# Patient Record
Sex: MALE | Race: WHITE | NOT HISPANIC OR LATINO | URBAN - METROPOLITAN AREA
[De-identification: names, ages, dates, MRNs, and addresses within clinical notes are randomized per-mention and may not be internally consistent; named-entity substitution may affect disease eponyms.]

---

## 2018-05-30 ENCOUNTER — TELEPHONE (OUTPATIENT)
Dept: FAMILY MEDICINE CLINIC | Facility: CLINIC | Age: 58
End: 2018-05-30

## 2022-08-02 ENCOUNTER — TELEPHONE (OUTPATIENT)
Dept: HEMATOLOGY ONCOLOGY | Facility: CLINIC | Age: 62
End: 2022-08-02

## 2022-08-02 NOTE — TELEPHONE ENCOUNTER
Patient calling to confirm if we received fax from his physician  I informed him we haven't received fax and gave fax number [phone]

## 2022-08-12 ENCOUNTER — TELEPHONE (OUTPATIENT)
Dept: HEMATOLOGY ONCOLOGY | Facility: CLINIC | Age: 62
End: 2022-08-12

## 2022-08-12 NOTE — TELEPHONE ENCOUNTER
Oncology [facility] Referral Intake    Is this a referral between [address] [place] [address] departments: [unfilled]     Speaking to the patient directly: [unfilled]     Relationship to patient of person speaking with: [unfilled]     What is the best call back phone number: [unfilled]      [facility] Oncology departments seen in previously: [unfilled]     Diagnosis Site: [unfilled]     Has the diagnosis been confirmed through biopsy and/or surgery: [unfilled]     Biopsy/Surgery Date: [unfilled]     Was the surgery and/or biopsy performed at [facility]: [unfilled]     Indicate the outside facility where the surgery/biopsy was performed: [unfilled]     Is this a referral for a 2nd opinion: [unfilled]     Names and Organizations of prior Oncologists: [unfilled]     Is the patient currently undergoing or scheduled to start oncology treatment: [unfilled]     Facility where patient is receiving/scheduled to recieve treatment: [unfilled]     Does the patient have a prior history of cancer diagnosis/treatment: [unfilled]     Patient received previous radiation therapy: [unfilled]     Indicate the outside facility where the prior radiation therapy was performed: [unfilled]     Patient received previous chemotherapy: [unfilled]     Indicate the outside facility where the prior chemotherapy was performed: [unfilled]     Did the patient complete prescribed treatment: [unfilled]     Did you provide the proper address for sending imaging disks: [unfilled]     Has the patient previously undergone genetic/genomic testing: [unfilled]     Did you advise the patient to bring a copy of the genetic/genomic test results w/ them to their visit: [unfilled]     Did you advise the patient to have records faxed to [phone]: [unfilled]     Did you send the new patient paperwork to the patient: [unfilled]     Did you confirm the patient's insurance coverage with them: @[facility]EL[facility]@     Was the patient offered an appointment within 10 business days: [unfilled]     Reason appt not offered w/ in 10 business days: [unfilled]

## 2022-08-16 ENCOUNTER — CONSULT (OUTPATIENT)
Dept: HEMATOLOGY ONCOLOGY | Facility: CLINIC | Age: 62
End: 2022-08-16
Payer: COMMERCIAL

## 2022-08-16 VITALS
TEMPERATURE: 97.1 F | OXYGEN SATURATION: 98 % | SYSTOLIC BLOOD PRESSURE: 128 MMHG | DIASTOLIC BLOOD PRESSURE: 82 MMHG | RESPIRATION RATE: 18 BRPM | WEIGHT: 238 LBS | HEART RATE: 90 BPM | BODY MASS INDEX: 35.25 KG/M2 | HEIGHT: 69 IN

## 2022-08-16 DIAGNOSIS — D72.829 LEUKOCYTOSIS, UNSPECIFIED TYPE: Primary | ICD-10-CM

## 2022-08-16 PROBLEM — R73.01 IMPAIRED FASTING GLUCOSE: Status: ACTIVE | Noted: 2022-08-16

## 2022-08-16 PROBLEM — E55.9 VITAMIN D DEFICIENCY: Status: ACTIVE | Noted: 2022-08-16

## 2022-08-16 PROBLEM — E66.09 OBESITY DUE TO EXCESS CALORIES: Status: ACTIVE | Noted: 2022-08-16

## 2022-08-16 PROBLEM — D86.9 SARCOIDOSIS: Status: ACTIVE | Noted: 2022-08-16

## 2022-08-16 PROBLEM — E78.49 OTHER HYPERLIPIDEMIA: Status: ACTIVE | Noted: 2022-08-16

## 2022-08-16 PROBLEM — J30.2 SEASONAL ALLERGIES: Status: ACTIVE | Noted: 2022-08-16

## 2022-08-16 PROCEDURE — 99204 OFFICE O/P NEW MOD 45 MIN: CPT | Performed by: PHYSICIAN ASSISTANT

## 2022-08-16 RX ORDER — FENOFIBRATE 145 MG/1
TABLET, COATED ORAL
COMMUNITY
Start: 2022-08-02

## 2022-08-16 RX ORDER — DIMENHYDRINATE 50 MG
TABLET ORAL
COMMUNITY

## 2022-08-16 RX ORDER — METFORMIN HYDROCHLORIDE 500 MG/1
TABLET, EXTENDED RELEASE ORAL
COMMUNITY
Start: 2022-08-02

## 2022-08-16 RX ORDER — GINKGO BILOBA LEAF EXTRACT 60 MG
CAPSULE ORAL
COMMUNITY

## 2022-08-16 NOTE — PROGRESS NOTES
Hematology/Oncology Outpatient Consult  Maximino Bennett 60 y o  male 1960 [id number]    Date:  8/16/2022      Assessment and Plan:  1  Leukocytosis  60year old male presents for consult for leukocytosis  CBC without differential is what was ordered by PCP, showed WBC 44 5, on 7/5/22  Hgb and platelets normal  Reviewed with patient unfortunately I cannot give any definitive info today or any further work up with a differential  The type of WBC that is elevated will then point to further work up  Reviewed possibilities to include CLL, CML, reactive phenomenon, possibly reactivation of sarcoidosis  Patient is also asymptomatic and no physical exam findings  He will complete CBC-D this week at [place] [address] in [facility]  Will then make further recommendations after this is completed  - CBC and differential; Future    HPI:  79-year-old male presents for consultation visit regarding leukocytosis  Patient's PCP is Dr Sandra Shaikh  He had a CBC without differential completed on 07/05/2022 which showed elevated WBC  There are no labs to compare prior to this 1 result  Dx with sarcoidosis about 20 years ago, dx with PCP (Oj) and pulmonary  Another doctor in the pulmonary office said it could be asthma  Most recent colon[facility] at [facility], about 6 years ago, [facility]      ROS: Review of Systems   Constitutional: Negative for appetite change, chills, fatigue, fever and unexpected weight change  Denies night sweats    Respiratory: Negative for cough and shortness of breath  Cardiovascular: Negative for chest pain, palpitations and leg swelling  Gastrointestinal: Negative for abdominal pain, constipation, diarrhea, nausea and vomiting  Genitourinary: Negative for difficulty urinating, dysuria and hematuria  Musculoskeletal: Positive for arthralgias  Skin: Negative      Neurological: Negative for dizziness, weakness, light-headedness and headaches  Hematological: Negative  Psychiatric/Behavioral: Negative          Past Medical History:   Diagnosis Date    Obesity due to excess calories     Other hyperlipidemia     Seasonal allergies     Vitamin D deficiency        Past Surgical History:   Procedure Laterality Date    CHOLECYSTECTOMY         Social History     Socioeconomic History    Marital status: /Civil Union     Spouse name: None    Number of children: None    Years of education: None    Highest education level: None   Occupational History    None   Tobacco Use    Smoking status: Never Smoker    Smokeless tobacco: Never Used   Substance and Sexual Activity    Alcohol use: Never    Drug use: Never    Sexual activity: None   Other Topics Concern    None   Social History Narrative    None     Social Determinants of Health     Financial Resource Strain: Not on file   Food Insecurity: Not on file   Transportation Needs: Not on file   Physical Activity: Not on file   Stress: Not on file   Social Connections: Not on file   Intimate Partner Violence: Not on file   Housing Stability: Not on file       Family History   Problem Relation Age of Onset    Esophageal cancer Mother     Colon cancer Father     Lymphoma Father     Lung cancer Father        No Known Allergies      Current Outpatient Medications:     aspirin 1 mg/mL SUSP, Take by mouth, Disp: , Rfl:     fenofibrate (TRICOR) 145 mg tablet, , Disp: , Rfl:     Flaxseed, Linseed, (Flax Seed Oil) 1000 MG CAPS, Take by mouth, Disp: , Rfl:     Ginseng 100 MG CAPS, Take by mouth, Disp: , Rfl:     metFORMIN (GLUCOPHAGE-XR) 500 mg 24 hr tablet, , Disp: , Rfl:     Pediatric Multivitamins-Fl (MULTIVITAMINS/FL PO), Take by mouth, Disp: , Rfl:       Physical Exam:  /82 (BP Location: Left arm)   Pulse 90   Temp (!) 97 1 °F (36 2 °C) (Tympanic)   Resp 18   Ht 5' 8 5" (1 74 m)   Wt 108 kg (238 lb)   SpO2 98%   BMI 35 66 kg/m²     Physical Exam  Vitals reviewed  Constitutional:       General: He is not in acute distress  Appearance: He is well-developed  He is not ill-appearing  HENT:      Head: Normocephalic and atraumatic  Eyes:      General: No scleral icterus  Conjunctiva/sclera: Conjunctivae normal    Cardiovascular:      Rate and Rhythm: Normal rate and regular rhythm  Heart sounds: Normal heart sounds  No murmur heard  Pulmonary:      Effort: Pulmonary effort is normal  No respiratory distress  Breath sounds: Normal breath sounds  Chest:   Breasts:      Right: No axillary adenopathy or supraclavicular adenopathy  Left: No axillary adenopathy or supraclavicular adenopathy  Abdominal:      Palpations: Abdomen is soft  Tenderness: There is no abdominal tenderness  Musculoskeletal:         General: No tenderness  Normal range of motion  Cervical back: Normal range of motion and neck supple  Right lower leg: No edema  Left lower leg: No edema  Lymphadenopathy:      Cervical: No cervical adenopathy  Upper Body:      Right upper body: No supraclavicular or axillary adenopathy  Left upper body: No supraclavicular or axillary adenopathy  Skin:     General: Skin is warm and dry  Neurological:      Mental Status: He is alert and oriented to person, place, and time  Cranial Nerves: No cranial nerve deficit  Psychiatric:         Mood and Affect: Mood normal          Behavior: Behavior normal        Patient voiced understanding and agreement in the above discussion  Aware to contact our office with questions/symptoms in the interim  This note has been generated by voice recognition software system  Therefore, there may be spelling, grammar, and or syntax errors  Please contact if questions arise

## 2022-08-17 ENCOUNTER — TELEPHONE (OUTPATIENT)
Dept: OTHER | Facility: OTHER | Age: 62
End: 2022-08-17

## 2022-08-17 NOTE — TELEPHONE ENCOUNTER
Lab Result: Critical Lab:WBC 52 7   Date/Time Drawn: 08/17/22 at 1124am   Ordering Provider: Dr Felice Guerra Name: [phone]/[id number] from [facility]       The following critical/stat result was read back to the lab as stated above and [employer] to the on-call provider  The provider confirmed receipt of the message

## 2022-08-18 ENCOUNTER — TELEPHONE (OUTPATIENT)
Dept: HEMATOLOGY ONCOLOGY | Facility: CLINIC | Age: 62
End: 2022-08-18

## 2022-08-18 DIAGNOSIS — D72.820 LYMPHOCYTOSIS: Primary | ICD-10-CM

## 2022-08-18 NOTE — TELEPHONE ENCOUNTER
CALL RETURN FORM   Reason for patient call? Patient calling to speak with Malvina Severe  She left a message @ 3:30pm and patient has some questions    Patient's primary oncologist? Dr Rigoberto Galeano   Name of person the patient was calling for? Buck   Any additional information to add, if applicable? [phone]   Informed patient that the message will be forwarded to the team and someone will get back to them as soon as possible    Did you relay this information to the patient?  yes

## 2022-08-18 NOTE — TELEPHONE ENCOUNTER
Left message for patient to review CBC-D  Reviewed further testing is warranted  Advised patient I will be out of the office on 8/19/22  If he calls back when I am not here, he can talk to nurse/Dr Lawler  Labs show he likely has CLL, as discussed at visit  Will just require monitoring, no treatment at this time  He needs leukemia/lymphoma flow cytometry, IgG  Order placed  He goes to [facility] so labs will need to be faxed

## 2022-08-18 NOTE — TELEPHONE ENCOUNTER
Spoke with patient  Reviewed this is likely CLL  Needs more blood tests  He does not have fax number for [facility] lab in [facility]  He will call tomorrow with fax number  Staff to then fax leukemia/lymphoma flow and IgG labs

## 2022-08-19 NOTE — TELEPHONE ENCOUNTER
Telephone call left voice message  I faxed lab rxs to [facility] at [facility] [phone] and to have them drawn about 1 week prior to fu appt with Patel Moody in October  Any questions to call me back at [phone]

## 2022-09-13 ENCOUNTER — TELEPHONE (OUTPATIENT)
Dept: HEMATOLOGY ONCOLOGY | Facility: CLINIC | Age: 62
End: 2022-09-13

## 2022-09-13 NOTE — TELEPHONE ENCOUNTER
Please call patient and ask if he completed the labs orders requested, was sent to his lab in [place] on 8/19

## 2022-09-13 NOTE — TELEPHONE ENCOUNTER
SARAIM to the patient to ask if he completed the lab orders that Gideon Mendiola PA-C requested and sent over to [facility] on 8/19/22  Informed patient to give the office a call back at [phone]

## 2022-09-15 ENCOUNTER — TELEPHONE (OUTPATIENT)
Dept: HEMATOLOGY ONCOLOGY | Facility: CLINIC | Age: 62
End: 2022-09-15

## 2022-09-15 NOTE — TELEPHONE ENCOUNTER
Pt returned telephone call to say that he will have labs drawn end of next week for his Oct appt with Carmen Garcia

## 2022-09-23 ENCOUNTER — TELEPHONE (OUTPATIENT)
Dept: OTHER | Facility: OTHER | Age: 62
End: 2022-09-23

## 2022-09-23 NOTE — TELEPHONE ENCOUNTER
Lab Result: WBC 50 7    Date/Time Drawn: 9/23/22 0900   Ordering Provider: Dr Kari Alamo Name: Gladis Luciano       The following critical/stat result was read back to the lab as stated above and [employer] to the on-call provider  The provider confirmed receipt of the message

## 2022-09-26 DIAGNOSIS — D72.820 LYMPHOCYTOSIS: ICD-10-CM

## 2022-09-26 DIAGNOSIS — D72.829 LEUKOCYTOSIS, UNSPECIFIED TYPE: Primary | ICD-10-CM

## 2022-09-28 ENCOUNTER — TELEPHONE (OUTPATIENT)
Dept: HEMATOLOGY ONCOLOGY | Facility: CLINIC | Age: 62
End: 2022-09-28

## 2022-10-01 ENCOUNTER — TELEPHONE (OUTPATIENT)
Dept: OTHER | Facility: OTHER | Age: 62
End: 2022-10-01

## 2022-10-01 ENCOUNTER — APPOINTMENT (OUTPATIENT)
Dept: LAB | Facility: MEDICAL CENTER | Age: 62
End: 2022-10-01
Payer: COMMERCIAL

## 2022-10-01 DIAGNOSIS — D72.829 LEUKOCYTOSIS, UNSPECIFIED TYPE: ICD-10-CM

## 2022-10-01 DIAGNOSIS — D72.820 LYMPHOCYTOSIS: ICD-10-CM

## 2022-10-01 LAB
ERYTHROCYTE [DISTWIDTH] IN BLOOD BY AUTOMATED COUNT: 13.2 % (ref 11.6–15.1)
HCT VFR BLD AUTO: 48.6 % (ref 36.5–49.3)
HGB BLD-MCNC: 15.5 G/DL (ref 12–17)
IGG SERPL-MCNC: 1130 MG/DL (ref 700–1600)
MCH RBC QN AUTO: 30 PG (ref 26.8–34.3)
MCHC RBC AUTO-ENTMCNC: 31.9 G/DL (ref 31.4–37.4)
MCV RBC AUTO: 94 FL (ref 82–98)
PLATELET # BLD AUTO: 252 THOUSANDS/UL (ref 149–390)
PMV BLD AUTO: 9.9 FL (ref 8.9–12.7)
RBC # BLD AUTO: 5.16 MILLION/UL (ref 3.88–5.62)
WBC # BLD AUTO: 51.84 THOUSAND/UL (ref 4.31–10.16)

## 2022-10-01 PROCEDURE — 85025 COMPLETE CBC W/AUTO DIFF WBC: CPT

## 2022-10-01 PROCEDURE — 82784 ASSAY IGA/IGD/IGG/IGM EACH: CPT

## 2022-10-01 PROCEDURE — 88184 FLOWCYTOMETRY/ TC 1 MARKER: CPT

## 2022-10-01 PROCEDURE — 85007 BL SMEAR W/DIFF WBC COUNT: CPT

## 2022-10-01 PROCEDURE — 88185 FLOWCYTOMETRY/TC ADD-ON: CPT

## 2022-10-01 PROCEDURE — 36415 COLL VENOUS BLD VENIPUNCTURE: CPT

## 2022-10-01 PROCEDURE — 85027 COMPLETE CBC AUTOMATED: CPT

## 2022-10-01 NOTE — TELEPHONE ENCOUNTER
Lab Result: WBC of 51 84   Date/Time Drawn: 10/1 @ 1040am    Ordering Provider: Ana Ayoub Name: delilah       The following critical/stat result was read back to the lab as stated above and [employer] to the on-call provider  The provider confirmed receipt of the message

## 2022-10-03 LAB
BASOPHILS # BLD MANUAL: 0 THOUSAND/UL (ref 0–0.1)
BASOPHILS NFR MAR MANUAL: 0 % (ref 0–1)
DIFFERENTIAL COMMENT: ABNORMAL
EOSINOPHIL # BLD MANUAL: 0 THOUSAND/UL (ref 0–0.4)
EOSINOPHIL NFR BLD MANUAL: 0 % (ref 0–6)
ERYTHROCYTE [DISTWIDTH] IN BLOOD BY AUTOMATED COUNT: 13.2 % (ref 11.6–15.1)
HCT VFR BLD AUTO: 48.6 % (ref 36.5–49.3)
HGB BLD-MCNC: 15.5 G/DL (ref 12–17)
LYMPHOCYTES # BLD AUTO: 41.99 THOUSAND/UL (ref 0.6–4.47)
LYMPHOCYTES # BLD AUTO: 81 % (ref 14–44)
MCH RBC QN AUTO: 30 PG (ref 26.8–34.3)
MCHC RBC AUTO-ENTMCNC: 31.9 G/DL (ref 31.4–37.4)
MCV RBC AUTO: 94 FL (ref 82–98)
MONOCYTES # BLD AUTO: 0.52 THOUSAND/UL (ref 0–1.22)
MONOCYTES NFR BLD: 1 % (ref 4–12)
NEUTROPHILS # BLD MANUAL: 5.7 THOUSAND/UL (ref 1.85–7.62)
NEUTS SEG NFR BLD AUTO: 11 % (ref 43–75)
PATHOLOGY REVIEW: YES
PLATELET # BLD AUTO: 252 THOUSANDS/UL (ref 149–390)
PLATELET BLD QL SMEAR: ADEQUATE
PMV BLD AUTO: 9.9 FL (ref 8.9–12.7)
RBC # BLD AUTO: 5.16 MILLION/UL (ref 3.88–5.62)
SMUDGE CELLS BLD QL SMEAR: PRESENT
VARIANT LYMPHS # BLD AUTO: 7 %
WBC # BLD AUTO: 51.84 THOUSAND/UL (ref 4.31–10.16)

## 2022-10-04 ENCOUNTER — OFFICE VISIT (OUTPATIENT)
Dept: HEMATOLOGY ONCOLOGY | Facility: CLINIC | Age: 62
End: 2022-10-04
Payer: COMMERCIAL

## 2022-10-04 VITALS
HEART RATE: 68 BPM | WEIGHT: 242 LBS | BODY MASS INDEX: 35.84 KG/M2 | RESPIRATION RATE: 18 BRPM | TEMPERATURE: 98.3 F | SYSTOLIC BLOOD PRESSURE: 120 MMHG | HEIGHT: 69 IN | OXYGEN SATURATION: 98 % | DIASTOLIC BLOOD PRESSURE: 84 MMHG

## 2022-10-04 DIAGNOSIS — C91.10 CLL (CHRONIC LYMPHOCYTIC LEUKEMIA) (HCC): Primary | ICD-10-CM

## 2022-10-04 LAB — SCAN RESULT: NORMAL

## 2022-10-04 PROCEDURE — 99214 OFFICE O/P EST MOD 30 MIN: CPT | Performed by: PHYSICIAN ASSISTANT

## 2022-10-04 RX ORDER — L.ACID/L.CASEI/B.BIF/B.LON/FOS 2B CELL-50
1 CAPSULE ORAL DAILY
COMMUNITY

## 2022-10-04 NOTE — PROGRESS NOTES
Hematology/Oncology Outpatient Follow-up  Wilbur Rg 60 y o  male 1960 [id number]    Date:  10/4/2022      Assessment and Plan:  1  CLL (chronic lymphocytic leukemia) ([facility] [address] )  60year old male presents for follow up for leukocytosis  Work up is consistent with CLL  Reviewed CLL with patient  Reviewed he only requires monitoring at this time  No tx needed at this time  We reviewed indications for treatment in the future, should he need tx  Unknown how long he has had this due to gap in blood work  He is presently asymptomatic  Reviewed will perform CLL FISH and IgVH to obtain more info on the behavior of his CLL, ie if it may progress more quickly or not  We reviewed Evusheld  He had COVID vaccine x 2, no boosters  IgG level is normal  He is not interested in Evusheld at this time  Follow up in 3 months  - Chronic Lymphocytic Leukemia, IgVH Mutation Status, Cell-based; Future  - CLL profile, fish; Future  - CBC and differential; Future  - IgG; Future    HPI:  60year old male presents for follow up for leukocytosis  He was seen in consult in Aug 2022  He had a CBC without differential completed on 07/05/2022 which showed elevated WBC      There are no labs to compare prior to this 1 result      Dx with sarcoidosis about 20 years ago, dx with PCP (Oj) and pulmonary  Another doctor in the pulmonary office said it could be asthma       Most recent colonoscopy was at [facility], about 6 years ago    Interval history:    ROS: Review of Systems   Constitutional: Negative for appetite change, chills, fatigue, fever and unexpected weight change  HENT: Negative for mouth sores and nosebleeds  Respiratory: Negative for cough and shortness of breath  Cardiovascular: Negative for chest pain, palpitations and leg swelling  Gastrointestinal: Negative for abdominal pain, blood in stool, constipation, diarrhea, nausea and vomiting     Genitourinary: Negative for difficulty urinating, dysuria and hematuria  Musculoskeletal: Negative for arthralgias  Skin: Negative  Neurological: Negative for dizziness, weakness, light-headedness, numbness and headaches  Hematological: Negative  Psychiatric/Behavioral: Negative          Past Medical History:   Diagnosis Date    Obesity due to excess calories     Other hyperlipidemia     Seasonal allergies     Vitamin D deficiency        Past Surgical History:   Procedure Laterality Date    CHOLECYSTECTOMY         Social History     Socioeconomic History    Marital status: /Civil Union     Spouse name: None    Number of children: None    Years of education: None    Highest education level: None   Occupational History    None   Tobacco Use    Smoking status: Never Smoker    Smokeless tobacco: Never Used   Substance and Sexual Activity    Alcohol use: Never    Drug use: Never    Sexual activity: None   Other Topics Concern    None   Social History Narrative    None     Social Determinants of Health     Financial Resource Strain: Not on file   Food Insecurity: Not on file   Transportation Needs: Not on file   Physical Activity: Not on file   Stress: Not on file   Social Connections: Not on file   Intimate Partner Violence: Not on file   Housing Stability: Not on file       Family History   Problem Relation Age of Onset    Esophageal cancer Mother     Colon cancer Father     Lymphoma Father     Lung cancer Father        No Known Allergies      Current Outpatient Medications:     Flaxseed, Linseed, (Flax Seed Oil) 1000 MG CAPS, Take by mouth, Disp: , Rfl:     Ginseng 100 MG CAPS, Take by mouth, Disp: , Rfl:     Multiple Vitamins-Minerals (Mens Multi Vitamin & Mineral) TABS, Take 1 tablet by mouth daily, Disp: , Rfl:     Probiotic Product (Probiotic Blend) CAPS, Take 1 capsule by mouth in the morning, Disp: , Rfl:     ZINC-VITAMIN C PO, Take 1 Dose by mouth daily Chewable, Disp: , Rfl:     fenofibrate (TRICOR) 145 mg tablet, , Disp: , Rfl:     metFORMIN (GLUCOPHAGE-XR) 500 mg 24 hr tablet, , Disp: , Rfl:       Physical Exam:  /84 (BP Location: Left arm, Patient Position: Sitting, Cuff Size: Large)   Pulse 68   Temp 98 3 °F (36 8 °C) (Temporal)   Resp 18   Ht 5' 8 5" (1 74 m)   Wt 110 kg (242 lb)   SpO2 98%   BMI 36 26 kg/m²     Physical Exam  Vitals reviewed  Constitutional:       General: He is not in acute distress  Appearance: He is well-developed  He is not ill-appearing  HENT:      Head: Normocephalic and atraumatic  Eyes:      General: No scleral icterus  Conjunctiva/sclera: Conjunctivae normal    Cardiovascular:      Rate and Rhythm: Normal rate and regular rhythm  Heart sounds: Normal heart sounds  No murmur heard  Pulmonary:      Effort: Pulmonary effort is normal  No respiratory distress  Breath sounds: Normal breath sounds  Abdominal:      Palpations: Abdomen is soft  Tenderness: There is no abdominal tenderness  Musculoskeletal:         General: No tenderness  Normal range of motion  Cervical back: Normal range of motion and neck supple  Right lower leg: No edema  Left lower leg: No edema  Lymphadenopathy:      Cervical: No cervical adenopathy  Skin:     General: Skin is warm and dry  Neurological:      Mental Status: He is alert and oriented to person, place, and time  Cranial Nerves: No cranial nerve deficit  Psychiatric:         Mood and Affect: Mood normal          Behavior: Behavior normal            Labs:  Lab Results   Component Value Date    WBC 51 84 (HH) 10/01/2022    HGB 15 5 10/01/2022    HCT 48 6 10/01/2022    MCV 94 10/01/2022     10/01/2022           Patient voiced understanding and agreement in the above discussion  Aware to contact our office with questions/symptoms in the interim  This note has been generated by voice recognition software system  Therefore, there may be spelling, grammar, and or syntax errors   Please contact if questions arise

## 2022-12-23 ENCOUNTER — TELEPHONE (OUTPATIENT)
Dept: HEMATOLOGY ONCOLOGY | Facility: CLINIC | Age: 62
End: 2022-12-23

## 2022-12-23 ENCOUNTER — APPOINTMENT (OUTPATIENT)
Dept: LAB | Facility: MEDICAL CENTER | Age: 62
End: 2022-12-23

## 2022-12-23 DIAGNOSIS — C91.10 CLL (CHRONIC LYMPHOCYTIC LEUKEMIA) (HCC): ICD-10-CM

## 2022-12-23 LAB
BASOPHILS # BLD AUTO: 0.23 THOUSANDS/ÂΜL (ref 0–0.1)
BASOPHILS NFR BLD AUTO: 0 % (ref 0–1)
EOSINOPHIL # BLD AUTO: 0.1 THOUSAND/ÂΜL (ref 0–0.61)
EOSINOPHIL NFR BLD AUTO: 0 % (ref 0–6)
ERYTHROCYTE [DISTWIDTH] IN BLOOD BY AUTOMATED COUNT: 13.2 % (ref 11.6–15.1)
HCT VFR BLD AUTO: 47.3 % (ref 36.5–49.3)
HGB BLD-MCNC: 14.8 G/DL (ref 12–17)
IGG SERPL-MCNC: 1050 MG/DL (ref 700–1600)
IMM GRANULOCYTES # BLD AUTO: 0.13 THOUSAND/UL (ref 0–0.2)
IMM GRANULOCYTES NFR BLD AUTO: 0 % (ref 0–2)
LYMPHOCYTES # BLD AUTO: 55.68 THOUSANDS/ÂΜL (ref 0.6–4.47)
LYMPHOCYTES NFR BLD AUTO: 90 % (ref 14–44)
MCH RBC QN AUTO: 29.5 PG (ref 26.8–34.3)
MCHC RBC AUTO-ENTMCNC: 31.3 G/DL (ref 31.4–37.4)
MCV RBC AUTO: 94 FL (ref 82–98)
MONOCYTES # BLD AUTO: 0.98 THOUSAND/ÂΜL (ref 0.17–1.22)
MONOCYTES NFR BLD AUTO: 2 % (ref 4–12)
NEUTROPHILS # BLD AUTO: 5.11 THOUSANDS/ÂΜL (ref 1.85–7.62)
NEUTS SEG NFR BLD AUTO: 8 % (ref 43–75)
NRBC BLD AUTO-RTO: 0 /100 WBCS
PLATELET # BLD AUTO: 254 THOUSANDS/UL (ref 149–390)
PMV BLD AUTO: 9.6 FL (ref 8.9–12.7)
RBC # BLD AUTO: 5.02 MILLION/UL (ref 3.88–5.62)
WBC # BLD AUTO: 62.23 THOUSAND/UL (ref 4.31–10.16)

## 2022-12-31 LAB — SCAN RESULT: NORMAL

## 2023-01-03 ENCOUNTER — OFFICE VISIT (OUTPATIENT)
Dept: HEMATOLOGY ONCOLOGY | Facility: CLINIC | Age: 63
End: 2023-01-03

## 2023-01-03 VITALS
SYSTOLIC BLOOD PRESSURE: 118 MMHG | TEMPERATURE: 97.1 F | RESPIRATION RATE: 18 BRPM | DIASTOLIC BLOOD PRESSURE: 72 MMHG | HEIGHT: 69 IN | BODY MASS INDEX: 35.99 KG/M2 | WEIGHT: 243 LBS | HEART RATE: 65 BPM | OXYGEN SATURATION: 97 %

## 2023-01-03 DIAGNOSIS — C91.10 CLL (CHRONIC LYMPHOCYTIC LEUKEMIA) (HCC): Primary | ICD-10-CM

## 2023-01-03 NOTE — PROGRESS NOTES
Hematology/Oncology Outpatient Follow-up  Yosvany Salgado 60 y o  male 1960 [id number]    Date:  1/3/2023      Assessment and Plan:  1  CLL (chronic lymphocytic leukemia) [facility])  58-year-old male presents for follow-up regarding CLL  This was recently diagnosed  FISH testing indicated that patient has favorable prognostic features meaning that his CLL hopefully will not progress quickly  IgVH mutation which also gives prognostic information is still pending  We did review that WBC does continue to slightly increased each time (flowsheets attached below)  At this time we will continue to monitor every 3 months  He will follow-up with Dr Hardik Garnett at the next visit  We reviewed signs and symptoms to look out for regarding CLL  Bulky adenopathy, night sweats that are drenching, fatigue, bruising, frequent infections (specifically upper respiratory)  Follow up in 3 months      - CBC and differential; Future  - IgG; Future      HPI:  60year old male presents for follow up for leukocytosis       He was seen in consult in Aug 2022       He had a CBC without differential completed on 07/05/2022 which showed elevated WBC      There are no labs to compare prior to this 1 result      Dx with sarcoidosis about 20 years ago, dx with PCP (Oj) and pulmonary  Another doctor in the pulmonary office said it could be asthma per patient       Most recent colonoscopy was at [facility], about 6 years ago    Interval history: no new complaints continues to feel well     ROS: Review of Systems   Constitutional: Negative for activity change, appetite change, chills, fatigue, fever and unexpected weight change  Respiratory: Negative for cough and shortness of breath  Cardiovascular: Negative for chest pain, palpitations and leg swelling  Gastrointestinal: Negative for abdominal pain, constipation, diarrhea, nausea and vomiting  Genitourinary: Negative for difficulty urinating, dysuria and hematuria  Musculoskeletal: Negative for arthralgias  Skin: Negative  Neurological: Negative for dizziness, weakness, light-headedness, numbness and headaches  Hematological: Negative  Psychiatric/Behavioral: Negative          Past Medical History:   Diagnosis Date   • Obesity due to excess calories    • Other hyperlipidemia    • Seasonal allergies    • Vitamin D deficiency        Past Surgical History:   Procedure Laterality Date   • CHOLECYSTECTOMY         Social History     Socioeconomic History   • Marital status: /Civil Union     Spouse name: Not on file   • Number of children: Not on file   • Years of education: Not on file   • Highest education level: Not on file   Occupational History   • Not on file   Tobacco Use   • Smoking status: Never   • Smokeless tobacco: Never   Substance and Sexual Activity   • Alcohol use: Never   • Drug use: Never   • Sexual activity: Not on file   Other Topics Concern   • Not on file   Social History Narrative   • Not on file     Social Determinants of Health     Financial Resource Strain: Not on file   Food Insecurity: Not on file   Transportation Needs: Not on file   Physical Activity: Not on file   Stress: Not on file   Social Connections: Not on file   Intimate Partner Violence: Not on file   Housing Stability: Not on file       Family History   Problem Relation Age of Onset   • Esophageal cancer Mother    • Colon cancer Father    • Lymphoma Father    • Lung cancer Father        No Known Allergies      Current Outpatient Medications:   •  fenofibrate (TRICOR) 145 mg tablet, , Disp: , Rfl:   •  Flaxseed, Linseed, (Flax Seed Oil) 1000 MG CAPS, Take by mouth, Disp: , Rfl:   •  Ginseng 100 MG CAPS, Take by mouth, Disp: , Rfl:   •  metFORMIN (GLUCOPHAGE-XR) 500 mg 24 hr tablet, , Disp: , Rfl:   •  Multiple Vitamins-Minerals (Mens Multi Vitamin & Mineral) TABS, Take 1 tablet by mouth daily, Disp: , Rfl:   •  Probiotic Product (Probiotic Blend) CAPS, Take 1 capsule by mouth in the morning, Disp: , Rfl:   •  ZINC-VITAMIN C PO, Take 1 Dose by mouth daily Chewable, Disp: , Rfl:       Physical Exam:  /72 (BP Location: Left arm)   Pulse 65   Temp (!) 97 1 °F (36 2 °C) (Tympanic)   Resp 18   Ht 5' 8 5" (1 74 m)   Wt 110 kg (243 lb)   SpO2 97%   BMI 36 41 kg/m²     Physical Exam  Vitals reviewed  Constitutional:       General: He is not in acute distress  Appearance: He is well-developed  He is not ill-appearing  HENT:      Head: Normocephalic and atraumatic  Eyes:      General: No scleral icterus  Conjunctiva/sclera: Conjunctivae normal    Cardiovascular:      Rate and Rhythm: Normal rate and regular rhythm  Heart sounds: Normal heart sounds  No murmur heard  Pulmonary:      Effort: Pulmonary effort is normal  No respiratory distress  Breath sounds: Normal breath sounds  Abdominal:      Palpations: Abdomen is soft  Tenderness: There is no abdominal tenderness  Musculoskeletal:         General: No tenderness  Normal range of motion  Cervical back: Normal range of motion and neck supple  Right lower leg: No edema  Left lower leg: No edema  Lymphadenopathy:      Cervical: No cervical adenopathy  Skin:     General: Skin is warm and dry  Neurological:      Mental Status: He is alert and oriented to person, place, and time  Cranial Nerves: No cranial nerve deficit  Psychiatric:         Mood and Affect: Mood normal          Behavior: Behavior normal        Labs:                Patient voiced understanding and agreement in the above discussion  Aware to contact our office with questions/symptoms in the interim  This note has been generated by voice recognition software system  Therefore, there may be spelling, grammar, and or syntax errors  Please contact if questions arise

## 2023-01-04 LAB — SCAN RESULT: NORMAL

## 2023-05-15 ENCOUNTER — APPOINTMENT (OUTPATIENT)
Dept: RADIOLOGY | Facility: MEDICAL CENTER | Age: 63
End: 2023-05-15

## 2023-05-15 DIAGNOSIS — R05.1 ACUTE COUGH: ICD-10-CM

## 2023-05-15 DIAGNOSIS — C91.10 LEUKEMIA, LYMPHOCYTIC, CHRONIC (HCC): ICD-10-CM

## 2023-05-15 DIAGNOSIS — J06.9 ACUTE RESPIRATORY DISEASE: ICD-10-CM

## 2023-07-29 ENCOUNTER — TELEPHONE (OUTPATIENT)
Dept: OTHER | Facility: OTHER | Age: 63
End: 2023-07-29

## 2023-07-29 ENCOUNTER — APPOINTMENT (OUTPATIENT)
Dept: LAB | Facility: MEDICAL CENTER | Age: 63
End: 2023-07-29
Payer: COMMERCIAL

## 2023-07-29 DIAGNOSIS — C91.10 CLL (CHRONIC LYMPHOCYTIC LEUKEMIA) (HCC): ICD-10-CM

## 2023-07-29 LAB
ALBUMIN SERPL BCP-MCNC: 3.8 G/DL (ref 3.5–5)
ALP SERPL-CCNC: 93 U/L (ref 46–116)
ALT SERPL W P-5'-P-CCNC: 31 U/L (ref 12–78)
ANION GAP SERPL CALCULATED.3IONS-SCNC: 5 MMOL/L
AST SERPL W P-5'-P-CCNC: 22 U/L (ref 5–45)
BASOPHILS # BLD MANUAL: 0 THOUSAND/UL (ref 0–0.1)
BASOPHILS NFR MAR MANUAL: 0 % (ref 0–1)
BILIRUB SERPL-MCNC: 0.59 MG/DL (ref 0.2–1)
BUN SERPL-MCNC: 17 MG/DL (ref 5–25)
CALCIUM SERPL-MCNC: 9.2 MG/DL (ref 8.3–10.1)
CHLORIDE SERPL-SCNC: 110 MMOL/L (ref 96–108)
CO2 SERPL-SCNC: 25 MMOL/L (ref 21–32)
CREAT SERPL-MCNC: 1.17 MG/DL (ref 0.6–1.3)
DIFFERENTIAL COMMENT: ABNORMAL
EOSINOPHIL # BLD MANUAL: 0.69 THOUSAND/UL (ref 0–0.4)
EOSINOPHIL NFR BLD MANUAL: 1 % (ref 0–6)
ERYTHROCYTE [DISTWIDTH] IN BLOOD BY AUTOMATED COUNT: 13.2 % (ref 11.6–15.1)
GFR SERPL CREATININE-BSD FRML MDRD: 65 ML/MIN/1.73SQ M
GLUCOSE P FAST SERPL-MCNC: 101 MG/DL (ref 65–99)
HCT VFR BLD AUTO: 46.7 % (ref 36.5–49.3)
HGB BLD-MCNC: 15.3 G/DL (ref 12–17)
LDH SERPL-CCNC: 237 U/L (ref 81–234)
LYMPHOCYTES # BLD AUTO: 58.16 THOUSAND/UL (ref 0.6–4.47)
LYMPHOCYTES # BLD AUTO: 84 % (ref 14–44)
MCH RBC QN AUTO: 30.7 PG (ref 26.8–34.3)
MCHC RBC AUTO-ENTMCNC: 32.8 G/DL (ref 31.4–37.4)
MCV RBC AUTO: 94 FL (ref 82–98)
MONOCYTES # BLD AUTO: 2.77 THOUSAND/UL (ref 0–1.22)
MONOCYTES NFR BLD: 4 % (ref 4–12)
NEUTROPHILS # BLD MANUAL: 7.62 THOUSAND/UL (ref 1.85–7.62)
NEUTS SEG NFR BLD AUTO: 11 % (ref 43–75)
PLATELET # BLD AUTO: 236 THOUSANDS/UL (ref 149–390)
PLATELET BLD QL SMEAR: ADEQUATE
PMV BLD AUTO: 9.7 FL (ref 8.9–12.7)
POTASSIUM SERPL-SCNC: 4.3 MMOL/L (ref 3.5–5.3)
PROT SERPL-MCNC: 7.7 G/DL (ref 6.4–8.4)
RBC # BLD AUTO: 4.99 MILLION/UL (ref 3.88–5.62)
RBC MORPH BLD: NORMAL
SMUDGE CELLS BLD QL SMEAR: PRESENT
SODIUM SERPL-SCNC: 140 MMOL/L (ref 135–147)
URATE SERPL-MCNC: 5.7 MG/DL (ref 3.5–8.5)
WBC # BLD AUTO: 69.24 THOUSAND/UL (ref 4.31–10.16)

## 2023-07-29 PROCEDURE — 84550 ASSAY OF BLOOD/URIC ACID: CPT

## 2023-07-29 PROCEDURE — 83615 LACTATE (LD) (LDH) ENZYME: CPT

## 2023-07-29 PROCEDURE — 82784 ASSAY IGA/IGD/IGG/IGM EACH: CPT

## 2023-07-29 PROCEDURE — 85027 COMPLETE CBC AUTOMATED: CPT

## 2023-07-29 PROCEDURE — 80053 COMPREHEN METABOLIC PANEL: CPT

## 2023-07-29 PROCEDURE — 85007 BL SMEAR W/DIFF WBC COUNT: CPT

## 2023-07-29 PROCEDURE — 36415 COLL VENOUS BLD VENIPUNCTURE: CPT

## 2023-07-29 NOTE — TELEPHONE ENCOUNTER
Lab Result: WBC 69.24   Date/Time Drawn: 07/29/2023 8:18 a.m. Ordering Provider: Alize Street MD   Lab Personnel's Name: [facility]       The following critical/stat result was read back to the lab as stated above and [employer] to the on-call provider. The provider confirmed receipt of the message.

## 2023-08-03 LAB
IGG SERPL-MCNC: 1080 MG/DL (ref 700–1600)
IGM SERPL-MCNC: 29 MG/DL (ref 40–230)

## 2023-08-15 ENCOUNTER — OFFICE VISIT (OUTPATIENT)
Dept: HEMATOLOGY ONCOLOGY | Facility: CLINIC | Age: 63
End: 2023-08-15
Payer: COMMERCIAL

## 2023-08-15 VITALS
HEIGHT: 69 IN | BODY MASS INDEX: 34.8 KG/M2 | WEIGHT: 235 LBS | RESPIRATION RATE: 18 BRPM | SYSTOLIC BLOOD PRESSURE: 126 MMHG | OXYGEN SATURATION: 97 % | DIASTOLIC BLOOD PRESSURE: 74 MMHG | TEMPERATURE: 97.8 F | HEART RATE: 56 BPM

## 2023-08-15 DIAGNOSIS — C91.10 CLL (CHRONIC LYMPHOCYTIC LEUKEMIA) (HCC): Primary | ICD-10-CM

## 2023-08-15 PROCEDURE — 99214 OFFICE O/P EST MOD 30 MIN: CPT | Performed by: PHYSICIAN ASSISTANT

## 2023-08-15 RX ORDER — MELATONIN
1000 DAILY
COMMUNITY

## 2023-08-15 NOTE — PROGRESS NOTES
Hematology/Oncology Outpatient Follow-up  Devora Patrick 58 y.o. male 1960 [id number]    Date:  8/15/2023      Assessment and Plan:  1. CLL (chronic lymphocytic leukemia) (HCC)  White blood cell 69.24 thousand. Hemoglobin and platelets normal.  White blood cell is stable. He is asymptomatic. No lymphadenopathy. Monitoring is favored. Follow-up in 4 months with labs. He has questions regarding medications recommended by PCP. There is no contraindication to any medication due to his CLL. - CBC and differential; Future  - IgG; Future  - Comprehensive metabolic panel; Future      HPI:  59-year-old male presents for follow-up visit regarding history of CLL. 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 deletion, negative for 11 q. deletion, negative for p53 deletion or amplification, negative for trisomy 12. Interval history: He remains asymptomatic    ROS: Review of Systems   Constitutional: Negative for appetite change, chills, fatigue, fever and unexpected weight change. Respiratory: Negative for cough and shortness of breath. Cardiovascular: Negative for chest pain, palpitations and leg swelling. Gastrointestinal: Negative for abdominal pain, constipation, diarrhea, nausea and vomiting. Genitourinary: Negative for difficulty urinating, dysuria and hematuria. Musculoskeletal: Negative for arthralgias. Skin: Negative. Neurological: Negative for dizziness, weakness, light-headedness, numbness and headaches. Hematological: Negative. Psychiatric/Behavioral: Negative.         Past Medical History:   Diagnosis Date   • Obesity due to excess calories    • Other hyperlipidemia    • Seasonal allergies    • Vitamin D deficiency        Past Surgical History:   Procedure Laterality Date   • CHOLECYSTECTOMY         Social History     Socioeconomic History   • Marital status: /Civil Union     Spouse name: None   • Number of children: None   • Years of education: None   • Highest education level: None Occupational History   • None   Tobacco Use   • Smoking status: Never     Passive exposure: Past (late 1990's)   • Smokeless tobacco: Never   Substance and Sexual Activity   • Alcohol use: Never   • Drug use: Never   • Sexual activity: None   Other Topics Concern   • None   Social History Narrative   • None     Social Determinants of Health     Financial Resource Strain: Not on file   Food Insecurity: Not on file   Transportation Needs: Not on file   Physical Activity: Not on file   Stress: Not on file   Social Connections: Not on file   Intimate Partner Violence: Not on file   Housing Stability: Not on file       Family History   Problem Relation Age of Onset   • Esophageal cancer Mother    • Colon cancer Father    • Lymphoma Father    • Lung cancer Father        No Known Allergies      Current Outpatient Medications:   •  cholecalciferol (VITAMIN D3) 1,000 units tablet, Take 1,000 Units by mouth daily, Disp: , Rfl:   •  Flaxseed, Linseed, (Flax Seed Oil) 1000 MG CAPS, Take by mouth, Disp: , Rfl:   •  Ginseng 100 MG CAPS, Take by mouth, Disp: , Rfl:   •  Multiple Vitamins-Minerals (Mens Multi Vitamin & Mineral) TABS, Take 1 tablet by mouth daily, Disp: , Rfl:   •  Probiotic Product (Probiotic Blend) CAPS, Take 1 capsule by mouth in the morning, Disp: , Rfl:   •  ZINC-VITAMIN C PO, Take 1 Dose by mouth daily Chewable, Disp: , Rfl:       Physical Exam:  /74 (BP Location: Right arm, Patient Position: Sitting, Cuff Size: Large)   Pulse 56   Temp 97.8 °F (36.6 °C) (Temporal)   Resp 18   Ht 5' 8.5" (1.74 m)   Wt 107 kg (235 lb)   SpO2 97%   BMI 35.21 kg/m²     Physical Exam  Vitals reviewed. Constitutional:       General: He is not in acute distress. Appearance: He is well-developed. He is not ill-appearing. HENT:      Head: Normocephalic and atraumatic. Eyes:      General: No scleral icterus.      Conjunctiva/sclera: Conjunctivae normal.   Cardiovascular:      Rate and Rhythm: Normal rate and regular rhythm. Heart sounds: Normal heart sounds. No murmur heard. Pulmonary:      Effort: Pulmonary effort is normal. No respiratory distress. Breath sounds: Normal breath sounds. Abdominal:      Palpations: Abdomen is soft. Tenderness: There is no abdominal tenderness. Musculoskeletal:         General: No tenderness. Normal range of motion. Cervical back: Normal range of motion and neck supple. Right lower leg: No edema. Left lower leg: No edema. Lymphadenopathy:      Cervical: No cervical adenopathy. Upper Body:      Right upper body: No supraclavicular or axillary adenopathy. Left upper body: No supraclavicular or axillary adenopathy. Skin:     General: Skin is warm and dry. Neurological:      Mental Status: He is alert and oriented to person, place, and time. Cranial Nerves: No cranial nerve deficit. Psychiatric:         Mood and Affect: Mood normal.         Behavior: Behavior normal.       Labs:   Lab Results   Component Value Date    WBC 69.24 (HH) 07/29/2023    HGB 15.3 07/29/2023    HCT 46.7 07/29/2023    MCV 94 07/29/2023     07/29/2023     I have spent 20 minutes with Patient  today in which greater than 50% of this time was spent in counseling/coordination of care regarding Diagnostic results, Risks and benefits of tx options, Instructions for management, Patient and family education, Impressions, Counseling / Coordination of care, Documenting in the medical record, Reviewing / ordering tests, medicine, procedures   and Obtaining or reviewing history  . Patient voiced understanding and agreement in the above discussion. Aware to contact our office with questions/symptoms in the interim. This note has been generated by voice recognition software system. Therefore, there may be spelling, grammar, and or syntax errors. Please contact if questions arise.

## 2023-12-14 ENCOUNTER — TELEPHONE (OUTPATIENT)
Dept: HEMATOLOGY ONCOLOGY | Facility: CLINIC | Age: 63
End: 2023-12-14

## 2023-12-15 ENCOUNTER — APPOINTMENT (OUTPATIENT)
Dept: LAB | Facility: MEDICAL CENTER | Age: 63
End: 2023-12-15
Payer: COMMERCIAL

## 2023-12-15 ENCOUNTER — TELEPHONE (OUTPATIENT)
Dept: HEMATOLOGY ONCOLOGY | Facility: CLINIC | Age: 63
End: 2023-12-15

## 2023-12-15 ENCOUNTER — TELEPHONE (OUTPATIENT)
Dept: OTHER | Facility: OTHER | Age: 63
End: 2023-12-15

## 2023-12-15 DIAGNOSIS — C91.10 CLL (CHRONIC LYMPHOCYTIC LEUKEMIA) (HCC): ICD-10-CM

## 2023-12-15 LAB
ALBUMIN SERPL BCP-MCNC: 4.5 G/DL (ref 3.5–5)
ALP SERPL-CCNC: 80 U/L (ref 34–104)
ALT SERPL W P-5'-P-CCNC: 30 U/L (ref 7–52)
ANION GAP SERPL CALCULATED.3IONS-SCNC: 11 MMOL/L
AST SERPL W P-5'-P-CCNC: 27 U/L (ref 13–39)
BASOPHILS # BLD MANUAL: 0 THOUSAND/UL (ref 0–0.1)
BASOPHILS NFR MAR MANUAL: 0 % (ref 0–1)
BILIRUB SERPL-MCNC: 0.52 MG/DL (ref 0.2–1)
BUN SERPL-MCNC: 25 MG/DL (ref 5–25)
CALCIUM SERPL-MCNC: 9.6 MG/DL (ref 8.4–10.2)
CHLORIDE SERPL-SCNC: 105 MMOL/L (ref 96–108)
CO2 SERPL-SCNC: 25 MMOL/L (ref 21–32)
CREAT SERPL-MCNC: 1.11 MG/DL (ref 0.6–1.3)
EOSINOPHIL # BLD MANUAL: 0 THOUSAND/UL (ref 0–0.4)
EOSINOPHIL NFR BLD MANUAL: 0 % (ref 0–6)
ERYTHROCYTE [DISTWIDTH] IN BLOOD BY AUTOMATED COUNT: 13.4 % (ref 11.6–15.1)
GFR SERPL CREATININE-BSD FRML MDRD: 69 ML/MIN/1.73SQ M
GLUCOSE SERPL-MCNC: 114 MG/DL (ref 65–140)
HCT VFR BLD AUTO: 48.1 % (ref 36.5–49.3)
HGB BLD-MCNC: 15.2 G/DL (ref 12–17)
IGG SERPL-MCNC: 926 MG/DL (ref 635–1741)
LYMPHOCYTES # BLD AUTO: 59.54 THOUSAND/UL (ref 0.6–4.47)
LYMPHOCYTES # BLD AUTO: 78 % (ref 14–44)
MCH RBC QN AUTO: 29.7 PG (ref 26.8–34.3)
MCHC RBC AUTO-ENTMCNC: 31.6 G/DL (ref 31.4–37.4)
MCV RBC AUTO: 94 FL (ref 82–98)
MONOCYTES # BLD AUTO: 0.72 THOUSAND/UL (ref 0–1.22)
MONOCYTES NFR BLD: 1 % (ref 4–12)
NEUTROPHILS # BLD MANUAL: 11.48 THOUSAND/UL (ref 1.85–7.62)
NEUTS SEG NFR BLD AUTO: 16 % (ref 43–75)
PLATELET # BLD AUTO: 239 THOUSANDS/UL (ref 149–390)
PLATELET BLD QL SMEAR: ADEQUATE
PMV BLD AUTO: 9.8 FL (ref 8.9–12.7)
POTASSIUM SERPL-SCNC: 4.4 MMOL/L (ref 3.5–5.3)
PROT SERPL-MCNC: 7.4 G/DL (ref 6.4–8.4)
RBC # BLD AUTO: 5.12 MILLION/UL (ref 3.88–5.62)
RBC MORPH BLD: NORMAL
SODIUM SERPL-SCNC: 141 MMOL/L (ref 135–147)
VARIANT LYMPHS # BLD AUTO: 5 %
WBC # BLD AUTO: 71.74 THOUSAND/UL (ref 4.31–10.16)

## 2023-12-15 PROCEDURE — 85007 BL SMEAR W/DIFF WBC COUNT: CPT

## 2023-12-15 PROCEDURE — 82784 ASSAY IGA/IGD/IGG/IGM EACH: CPT

## 2023-12-15 PROCEDURE — 85027 COMPLETE CBC AUTOMATED: CPT

## 2023-12-15 PROCEDURE — 36415 COLL VENOUS BLD VENIPUNCTURE: CPT

## 2023-12-15 PROCEDURE — 80053 COMPREHEN METABOLIC PANEL: CPT

## 2023-12-15 NOTE — TELEPHONE ENCOUNTER
Received a critical lab message about WBC 71.74    Per chart review blood is within patient's baseline, has CLL, he follows up with Dr. Sandi Church    I attempted to call the patient to update but no answer    He has follow-up appointment on 12/19.

## 2023-12-15 NOTE — TELEPHONE ENCOUNTER
Lab Result: WBC 71.74   Date/Time Drawn: 12/15/2023/9:39 A   Ordering Provider: Callie Shipley PA-C    Lab Personnel's Name: Henrique Yu. Tech        The following critical/stat result was read back to the lab as stated above and [employer] to the on-call provider. The provider confirmed receipt of the message.

## 2023-12-19 ENCOUNTER — OFFICE VISIT (OUTPATIENT)
Dept: HEMATOLOGY ONCOLOGY | Facility: CLINIC | Age: 63
End: 2023-12-19
Payer: COMMERCIAL

## 2023-12-19 VITALS
DIASTOLIC BLOOD PRESSURE: 70 MMHG | OXYGEN SATURATION: 96 % | HEIGHT: 69 IN | BODY MASS INDEX: 35.25 KG/M2 | TEMPERATURE: 96.9 F | WEIGHT: 238 LBS | SYSTOLIC BLOOD PRESSURE: 118 MMHG | HEART RATE: 56 BPM | RESPIRATION RATE: 18 BRPM

## 2023-12-19 DIAGNOSIS — C91.10 CLL (CHRONIC LYMPHOCYTIC LEUKEMIA) (HCC): Primary | ICD-10-CM

## 2023-12-19 PROCEDURE — 99213 OFFICE O/P EST LOW 20 MIN: CPT | Performed by: PHYSICIAN ASSISTANT

## 2023-12-19 NOTE — PROGRESS NOTES
Hematology/Oncology Outpatient Follow-up  Sekou Dowling 63 y.o. male 1960 [id number]    Date:  12/19/2023      Assessment and Plan:    1. CLL (chronic lymphocytic leukemia) (HCC)  63-year-old male presents for follow-up visit regarding history of CLL.  White count is stable.  No anemia or thrombocytopenia.  Patient is asymptomatic.  No B symptoms.  Monitoring is favored.  Follow-up in 4 months.    - CBC and differential; Future  - IgG; Future  - Comprehensive metabolic panel; Future     HPI:  63-year-old male presents for follow-up visit regarding history of CLL.     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 deletion, negative for p53 deletion or amplification, negative for trisomy 12.    interval history: no complaints, feeling well   Denies night sweats     ROS: Review of Systems   Constitutional:  Negative for appetite change, chills, fatigue, fever and unexpected weight change.   HENT:  Negative for mouth sores and nosebleeds.    Respiratory:  Negative for cough and shortness of breath.    Cardiovascular:  Negative for chest pain, palpitations and leg swelling.   Gastrointestinal:  Negative for abdominal pain, constipation, diarrhea, nausea and vomiting.   Genitourinary:  Negative for difficulty urinating, dysuria and hematuria.   Musculoskeletal:  Negative for arthralgias.   Skin: Negative.    Neurological:  Negative for dizziness, weakness, light-headedness, numbness and headaches.   Hematological: Negative.    Psychiatric/Behavioral: Negative.         Past Medical History:   Diagnosis Date    Obesity due to excess calories     Other hyperlipidemia     Seasonal allergies     Vitamin D deficiency        Past Surgical History:   Procedure Laterality Date    CHOLECYSTECTOMY         Social History     Socioeconomic History    Marital status: /Civil Union     Spouse name: Not on file    Number of children: Not on file    Years of education: Not on file    Highest education level: Not on  "file   Occupational History    Not on file   Tobacco Use    Smoking status: Never     Passive exposure: Past (late 1990's)    Smokeless tobacco: Never   Substance and Sexual Activity    Alcohol use: Never    Drug use: Never    Sexual activity: Not on file   Other Topics Concern    Not on file   Social History Narrative    Not on file     Social Determinants of Health     Financial Resource Strain: Not on file   Food Insecurity: Not on file   Transportation Needs: Not on file   Physical Activity: Not on file   Stress: Not on file   Social Connections: Not on file   Intimate Partner Violence: Not on file   Housing Stability: Not on file     Family History   Problem Relation Age of Onset    Esophageal cancer Mother     Colon cancer Father     Lymphoma Father     Lung cancer Father      No Known Allergies      Current Outpatient Medications:     cholecalciferol (VITAMIN D3) 1,000 units tablet, Take 1,000 Units by mouth daily, Disp: , Rfl:     Flaxseed, Linseed, (Flax Seed Oil) 1000 MG CAPS, Take by mouth, Disp: , Rfl:     Ginseng 100 MG CAPS, Take by mouth, Disp: , Rfl:     Multiple Vitamins-Minerals (Mens Multi Vitamin & Mineral) TABS, Take 1 tablet by mouth daily, Disp: , Rfl:     Probiotic Product (Probiotic Blend) CAPS, Take 1 capsule by mouth in the morning, Disp: , Rfl:     ZINC-VITAMIN C PO, Take 1 Dose by mouth daily Chewable, Disp: , Rfl:       Physical Exam:  /70 (BP Location: Left arm)   Pulse 56   Temp (!) 96.9 °F (36.1 °C) (Tympanic)   Resp 18   Ht 5' 8.5\" (1.74 m)   Wt 108 kg (238 lb)   SpO2 96%   BMI 35.66 kg/m²     Physical Exam  Vitals reviewed.   Constitutional:       General: He is not in acute distress.     Appearance: He is well-developed. He is not ill-appearing.   HENT:      Head: Normocephalic and atraumatic.   Eyes:      General: No scleral icterus.     Conjunctiva/sclera: Conjunctivae normal.   Cardiovascular:      Rate and Rhythm: Normal rate and regular rhythm.      Heart sounds: " Normal heart sounds. No murmur heard.  Pulmonary:      Effort: Pulmonary effort is normal. No respiratory distress.      Breath sounds: Normal breath sounds.   Abdominal:      Palpations: Abdomen is soft.      Tenderness: There is no abdominal tenderness.   Musculoskeletal:         General: No tenderness. Normal range of motion.      Cervical back: Normal range of motion and neck supple.      Right lower leg: No edema.      Left lower leg: No edema.   Lymphadenopathy:      Cervical: No cervical adenopathy.      Upper Body:      Right upper body: No supraclavicular or axillary adenopathy.      Left upper body: No supraclavicular or axillary adenopathy.   Skin:     General: Skin is warm and dry.   Neurological:      Mental Status: He is alert and oriented to person, place, and time.      Cranial Nerves: No cranial nerve deficit.   Psychiatric:         Mood and Affect: Mood normal.         Behavior: Behavior normal.       Labs:  Lab Results   Component Value Date    WBC 71.74 (HH) 12/15/2023    HGB 15.2 12/15/2023    HCT 48.1 12/15/2023    MCV 94 12/15/2023     12/15/2023     I have spent 30 minutes with Patient  today in which greater than 50% of this time was spent in counseling/coordination of care regarding Diagnostic results, Risks and benefits of tx options, Instructions for management, Patient and family education, Importance of tx compliance, Impressions, Documenting in the medical record, Reviewing / ordering tests, medicine, procedures  , and Obtaining or reviewing history  .    Patient voiced understanding and agreement in the above discussion. Aware to contact our office with questions/symptoms in the interim.     This note has been generated by voice recognition software system.  Therefore, there may be spelling, grammar, and or syntax errors. Please contact if questions arise.

## 2024-04-19 ENCOUNTER — APPOINTMENT (OUTPATIENT)
Dept: LAB | Facility: MEDICAL CENTER | Age: 64
End: 2024-04-19
Payer: COMMERCIAL

## 2024-04-19 DIAGNOSIS — C91.10 CLL (CHRONIC LYMPHOCYTIC LEUKEMIA) (HCC): ICD-10-CM

## 2024-04-19 LAB
ALBUMIN SERPL BCP-MCNC: 4.4 G/DL (ref 3.5–5)
ALP SERPL-CCNC: 75 U/L (ref 34–104)
ALT SERPL W P-5'-P-CCNC: 21 U/L (ref 7–52)
ANION GAP SERPL CALCULATED.3IONS-SCNC: 7 MMOL/L (ref 4–13)
AST SERPL W P-5'-P-CCNC: 20 U/L (ref 13–39)
BASOPHILS # BLD MANUAL: 0 THOUSAND/UL (ref 0–0.1)
BASOPHILS NFR MAR MANUAL: 0 % (ref 0–1)
BILIRUB SERPL-MCNC: 0.61 MG/DL (ref 0.2–1)
BUN SERPL-MCNC: 18 MG/DL (ref 5–25)
CALCIUM SERPL-MCNC: 9.5 MG/DL (ref 8.4–10.2)
CHLORIDE SERPL-SCNC: 104 MMOL/L (ref 96–108)
CO2 SERPL-SCNC: 28 MMOL/L (ref 21–32)
CREAT SERPL-MCNC: 0.98 MG/DL (ref 0.6–1.3)
EOSINOPHIL # BLD MANUAL: 0.79 THOUSAND/UL (ref 0–0.4)
EOSINOPHIL NFR BLD MANUAL: 1 % (ref 0–6)
ERYTHROCYTE [DISTWIDTH] IN BLOOD BY AUTOMATED COUNT: 13.1 % (ref 11.6–15.1)
GFR SERPL CREATININE-BSD FRML MDRD: 80 ML/MIN/1.73SQ M
GLUCOSE P FAST SERPL-MCNC: 78 MG/DL (ref 65–99)
HCT VFR BLD AUTO: 49 % (ref 36.5–49.3)
HGB BLD-MCNC: 15.5 G/DL (ref 12–17)
IGG SERPL-MCNC: 947 MG/DL (ref 635–1741)
LYMPHOCYTES # BLD AUTO: 68.35 THOUSAND/UL (ref 0.6–4.47)
LYMPHOCYTES # BLD AUTO: 87 % (ref 14–44)
MCH RBC QN AUTO: 30 PG (ref 26.8–34.3)
MCHC RBC AUTO-ENTMCNC: 31.6 G/DL (ref 31.4–37.4)
MCV RBC AUTO: 95 FL (ref 82–98)
MONOCYTES # BLD AUTO: 0 THOUSAND/UL (ref 0–1.22)
MONOCYTES NFR BLD: 0 % (ref 4–12)
NEUTROPHILS # BLD MANUAL: 9.43 THOUSAND/UL (ref 1.85–7.62)
NEUTS SEG NFR BLD AUTO: 12 % (ref 43–75)
PLATELET # BLD AUTO: 231 THOUSANDS/UL (ref 149–390)
PLATELET BLD QL SMEAR: ADEQUATE
PMV BLD AUTO: 9.8 FL (ref 8.9–12.7)
POTASSIUM SERPL-SCNC: 4.4 MMOL/L (ref 3.5–5.3)
PROT SERPL-MCNC: 7.2 G/DL (ref 6.4–8.4)
RBC # BLD AUTO: 5.17 MILLION/UL (ref 3.88–5.62)
RBC MORPH BLD: NORMAL
SODIUM SERPL-SCNC: 139 MMOL/L (ref 135–147)
WBC # BLD AUTO: 78.56 THOUSAND/UL (ref 4.31–10.16)

## 2024-04-19 PROCEDURE — 82784 ASSAY IGA/IGD/IGG/IGM EACH: CPT

## 2024-04-19 PROCEDURE — 85007 BL SMEAR W/DIFF WBC COUNT: CPT

## 2024-04-19 PROCEDURE — 36415 COLL VENOUS BLD VENIPUNCTURE: CPT

## 2024-04-19 PROCEDURE — 80053 COMPREHEN METABOLIC PANEL: CPT

## 2024-04-19 PROCEDURE — 85027 COMPLETE CBC AUTOMATED: CPT

## 2024-04-24 ENCOUNTER — OFFICE VISIT (OUTPATIENT)
Dept: HEMATOLOGY ONCOLOGY | Facility: CLINIC | Age: 64
End: 2024-04-24
Payer: COMMERCIAL

## 2024-04-24 VITALS
RESPIRATION RATE: 17 BRPM | OXYGEN SATURATION: 96 % | HEART RATE: 68 BPM | DIASTOLIC BLOOD PRESSURE: 72 MMHG | WEIGHT: 237 LBS | HEIGHT: 69 IN | TEMPERATURE: 98.7 F | SYSTOLIC BLOOD PRESSURE: 114 MMHG | BODY MASS INDEX: 35.1 KG/M2

## 2024-04-24 DIAGNOSIS — C91.10 CLL (CHRONIC LYMPHOCYTIC LEUKEMIA) (HCC): Primary | ICD-10-CM

## 2024-04-24 PROCEDURE — 99214 OFFICE O/P EST MOD 30 MIN: CPT | Performed by: PHYSICIAN ASSISTANT

## 2024-04-24 NOTE — PROGRESS NOTES
Hematology/Oncology Outpatient Follow-up  Sekou Dowling 64 y.o. male 1960 [id number]    Date:  4/24/2024  Assessment and Plan:  1. CLL (chronic lymphocytic leukemia) (HCC)  64-year-old male presents for follow-up visit regarding history of CLL.  He has not required treatment.  He does not have any B symptoms.    There is no anemia or thrombocytopenia.  He has no lymphadenopathy.     IgG normal.    We reviewed indications of treatment of CLL.     Follow-up labs in 4 months.    - CBC and differential; Future  - IgG; Future  - Comprehensive metabolic panel; Future     HPI:  63-year-old male presents for follow-up visit regarding history of CLL.     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 deletion, negative for p53 deletion or amplification, negative for trisomy 12.     Interval history:   had URI, cough lingered; negative CXR  Had poison ivy, took longer to recover than usual     ROS: Review of Systems   Constitutional:  Negative for appetite change, chills, fatigue, fever and unexpected weight change.   Respiratory:  Negative for cough and shortness of breath.    Cardiovascular:  Negative for chest pain, palpitations and leg swelling.   Gastrointestinal:  Negative for abdominal pain, blood in stool, constipation, diarrhea, nausea and vomiting.   Genitourinary:  Negative for difficulty urinating, dysuria and hematuria.   Musculoskeletal:  Negative for arthralgias.   Skin: Negative.    Neurological:  Negative for dizziness, weakness, light-headedness, numbness and headaches.   Hematological: Negative.    Psychiatric/Behavioral: Negative.       Past Medical History:   Diagnosis Date    Obesity due to excess calories     Other hyperlipidemia     Seasonal allergies     Vitamin D deficiency        Past Surgical History:   Procedure Laterality Date    CHOLECYSTECTOMY         Social History     Socioeconomic History    Marital status: /Civil Union     Spouse name: Not on file    Number of children:  "Not on file    Years of education: Not on file    Highest education level: Not on file   Occupational History    Not on file   Tobacco Use    Smoking status: Never     Passive exposure: Past (late 1990's)    Smokeless tobacco: Never   Substance and Sexual Activity    Alcohol use: Never    Drug use: Never    Sexual activity: Not on file   Other Topics Concern    Not on file   Social History Narrative    Not on file     Social Determinants of Health     Financial Resource Strain: Not on file   Food Insecurity: Not on file   Transportation Needs: Not on file   Physical Activity: Not on file   Stress: Not on file   Social Connections: Not on file   Intimate Partner Violence: Not on file   Housing Stability: Not on file       Family History   Problem Relation Age of Onset    Esophageal cancer Mother     Colon cancer Father     Lymphoma Father     Lung cancer Father        No Known Allergies      Current Outpatient Medications:     Flaxseed, Linseed, (Flax Seed Oil) 1000 MG CAPS, Take by mouth, Disp: , Rfl:     Ginseng 100 MG CAPS, Take by mouth, Disp: , Rfl:     Multiple Vitamins-Minerals (Mens Multi Vitamin & Mineral) TABS, Take 1 tablet by mouth daily, Disp: , Rfl:     Probiotic Product (Probiotic Blend) CAPS, Take 1 capsule by mouth in the morning, Disp: , Rfl:     ZINC-VITAMIN C PO, Take 1 Dose by mouth daily Chewable, Disp: , Rfl:     cholecalciferol (VITAMIN D3) 1,000 units tablet, Take 1,000 Units by mouth daily, Disp: , Rfl:     Physical Exam:  /72 (BP Location: Right arm, Patient Position: Sitting, Cuff Size: Adult)   Pulse 68   Temp 98.7 °F (37.1 °C)   Resp 17   Ht 5' 8.5\" (1.74 m)   Wt 108 kg (237 lb)   SpO2 96%   BMI 35.51 kg/m²     Physical Exam  Vitals reviewed.   Constitutional:       General: He is not in acute distress.     Appearance: He is well-developed. He is not ill-appearing.   HENT:      Head: Normocephalic and atraumatic.   Eyes:      General: No scleral icterus.     " Conjunctiva/sclera: Conjunctivae normal.   Cardiovascular:      Rate and Rhythm: Normal rate and regular rhythm.      Heart sounds: Normal heart sounds. No murmur heard.  Pulmonary:      Effort: Pulmonary effort is normal. No respiratory distress.      Breath sounds: Normal breath sounds.   Abdominal:      Palpations: Abdomen is soft.      Tenderness: There is no abdominal tenderness.   Musculoskeletal:         General: No tenderness. Normal range of motion.      Cervical back: Normal range of motion and neck supple.      Right lower leg: No edema.      Left lower leg: No edema.   Lymphadenopathy:      Cervical: No cervical adenopathy.      Upper Body:      Right upper body: No supraclavicular or axillary adenopathy.      Left upper body: No supraclavicular or axillary adenopathy.   Skin:     General: Skin is warm and dry.   Neurological:      Mental Status: He is alert and oriented to person, place, and time.      Cranial Nerves: No cranial nerve deficit.   Psychiatric:         Mood and Affect: Mood normal.         Behavior: Behavior normal.       Labs:          I have spent 30 minutes with Patient  today in which greater than 50% of this time was spent in counseling/coordination of care regarding Diagnostic results, Risks and benefits of tx options, Instructions for management, Patient and family education, Impressions, Documenting in the medical record, Reviewing / ordering tests, medicine, procedures  , and Obtaining or reviewing history  .     Patient voiced understanding and agreement in the above discussion. Aware to contact our office with questions/symptoms in the interim.     This note has been generated by voice recognition software system.  Therefore, there may be spelling, grammar, and or syntax errors. Please contact if questions arise.

## 2024-08-23 ENCOUNTER — APPOINTMENT (OUTPATIENT)
Dept: LAB | Facility: CLINIC | Age: 64
End: 2024-08-23
Payer: COMMERCIAL

## 2024-08-23 DIAGNOSIS — C91.10 CLL (CHRONIC LYMPHOCYTIC LEUKEMIA) (HCC): ICD-10-CM

## 2024-08-23 LAB
ALBUMIN SERPL BCG-MCNC: 4.4 G/DL (ref 3.5–5)
ALP SERPL-CCNC: 86 U/L (ref 34–104)
ALT SERPL W P-5'-P-CCNC: 21 U/L (ref 7–52)
ANION GAP SERPL CALCULATED.3IONS-SCNC: 6 MMOL/L (ref 4–13)
AST SERPL W P-5'-P-CCNC: 16 U/L (ref 13–39)
BILIRUB SERPL-MCNC: 0.42 MG/DL (ref 0.2–1)
BUN SERPL-MCNC: 19 MG/DL (ref 5–25)
CALCIUM SERPL-MCNC: 9.6 MG/DL (ref 8.4–10.2)
CHLORIDE SERPL-SCNC: 105 MMOL/L (ref 96–108)
CO2 SERPL-SCNC: 29 MMOL/L (ref 21–32)
CREAT SERPL-MCNC: 1.04 MG/DL (ref 0.6–1.3)
GFR SERPL CREATININE-BSD FRML MDRD: 74 ML/MIN/1.73SQ M
GLUCOSE SERPL-MCNC: 94 MG/DL (ref 65–140)
IGG SERPL-MCNC: 957 MG/DL (ref 635–1741)
POTASSIUM SERPL-SCNC: 4.2 MMOL/L (ref 3.5–5.3)
PROT SERPL-MCNC: 7.3 G/DL (ref 6.4–8.4)
SODIUM SERPL-SCNC: 140 MMOL/L (ref 135–147)

## 2024-08-23 PROCEDURE — 82784 ASSAY IGA/IGD/IGG/IGM EACH: CPT

## 2024-08-23 PROCEDURE — 85007 BL SMEAR W/DIFF WBC COUNT: CPT

## 2024-08-23 PROCEDURE — 36415 COLL VENOUS BLD VENIPUNCTURE: CPT

## 2024-08-23 PROCEDURE — 80053 COMPREHEN METABOLIC PANEL: CPT

## 2024-08-23 PROCEDURE — 85027 COMPLETE CBC AUTOMATED: CPT

## 2024-08-24 LAB
BASOPHILS # BLD MANUAL: 0 THOUSAND/UL (ref 0–0.1)
BASOPHILS NFR MAR MANUAL: 0 % (ref 0–1)
EOSINOPHIL # BLD MANUAL: 0 THOUSAND/UL (ref 0–0.4)
EOSINOPHIL NFR BLD MANUAL: 0 % (ref 0–6)
ERYTHROCYTE [DISTWIDTH] IN BLOOD BY AUTOMATED COUNT: 13.2 % (ref 11.6–15.1)
HCT VFR BLD AUTO: 47.7 % (ref 36.5–49.3)
HGB BLD-MCNC: 15.2 G/DL (ref 12–17)
LYMPHOCYTES # BLD AUTO: 75.46 THOUSAND/UL (ref 0.6–4.47)
LYMPHOCYTES # BLD AUTO: 94 % (ref 14–44)
MCH RBC QN AUTO: 30.3 PG (ref 26.8–34.3)
MCHC RBC AUTO-ENTMCNC: 31.9 G/DL (ref 31.4–37.4)
MCV RBC AUTO: 95 FL (ref 82–98)
MONOCYTES # BLD AUTO: 0 THOUSAND/UL (ref 0–1.22)
MONOCYTES NFR BLD: 0 % (ref 4–12)
NEUTROPHILS # BLD MANUAL: 4.82 THOUSAND/UL (ref 1.85–7.62)
NEUTS SEG NFR BLD AUTO: 6 % (ref 43–75)
PLATELET # BLD AUTO: 235 THOUSANDS/UL (ref 149–390)
PLATELET BLD QL SMEAR: ADEQUATE
PMV BLD AUTO: 9.8 FL (ref 8.9–12.7)
RBC # BLD AUTO: 5.01 MILLION/UL (ref 3.88–5.62)
RBC MORPH BLD: NORMAL
WBC # BLD AUTO: 80.28 THOUSAND/UL (ref 4.31–10.16)

## 2024-08-27 ENCOUNTER — OFFICE VISIT (OUTPATIENT)
Dept: HEMATOLOGY ONCOLOGY | Facility: CLINIC | Age: 64
End: 2024-08-27
Payer: COMMERCIAL

## 2024-08-27 VITALS
SYSTOLIC BLOOD PRESSURE: 126 MMHG | HEART RATE: 53 BPM | OXYGEN SATURATION: 96 % | WEIGHT: 227 LBS | HEIGHT: 69 IN | BODY MASS INDEX: 33.62 KG/M2 | RESPIRATION RATE: 18 BRPM | TEMPERATURE: 97.6 F | DIASTOLIC BLOOD PRESSURE: 74 MMHG

## 2024-08-27 DIAGNOSIS — C91.10 CLL (CHRONIC LYMPHOCYTIC LEUKEMIA) (HCC): Primary | ICD-10-CM

## 2024-08-27 PROCEDURE — 99214 OFFICE O/P EST MOD 30 MIN: CPT | Performed by: PHYSICIAN ASSISTANT

## 2024-08-27 RX ORDER — VITAMIN B COMPLEX
1 CAPSULE ORAL DAILY
COMMUNITY

## 2024-08-27 NOTE — PROGRESS NOTES
Hematology/Oncology Outpatient Follow-up  Sekou Dowling 64 y.o. male 1960 [id number]    Date:  8/27/2024      Assessment and Plan:  1. CLL (chronic lymphocytic leukemia) ([facility])  Follow-up for CLL  Lymphocyte count continues to slowly increase however no anemia, thrombocytopenia, no fevers chills night sweats, no recent or frequent infections.  No palpable lymphadenopathy.    Follow-up in 4 months with labs prior.    - CBC and differential; Future  - Comprehensive metabolic panel; Future  - IgG; Future     HPI:  63-year-old male presents for follow-up visit regarding history of CLL.      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 deletion, negative for p53 deletion or amplification, negative for trisomy 12.     Interval history:    ROS: Review of Systems   Constitutional:  Negative for activity change, appetite change, chills, fatigue, fever and unexpected weight change.   Respiratory:  Negative for cough and shortness of breath.    Cardiovascular:  Negative for chest pain, palpitations and leg swelling.   Gastrointestinal:  Negative for abdominal pain, constipation, diarrhea, nausea and vomiting.   Genitourinary:  Negative for difficulty urinating, dysuria and hematuria.   Musculoskeletal:  Negative for arthralgias.   Skin: Negative.    Neurological:  Negative for dizziness, weakness, light-headedness, numbness and headaches.   Hematological: Negative.    Psychiatric/Behavioral: Negative.       Past Medical History:   Diagnosis Date    Obesity due to excess calories     Other hyperlipidemia     Seasonal allergies     Vitamin D deficiency        Past Surgical History:   Procedure Laterality Date    CHOLECYSTECTOMY         Social History     Socioeconomic History    Marital status: /Civil Union     Spouse name: None    Number of children: None    Years of education: None    Highest education level: None   Occupational History    None   Tobacco Use    Smoking status: Never     Passive exposure:  "Past (late 1990's)    Smokeless tobacco: Never   Substance and Sexual Activity    Alcohol use: Never    Drug use: Never    Sexual activity: None   Other Topics Concern    None   Social History Narrative    None     Social Determinants of Health     Financial Resource Strain: Not on file   Food Insecurity: Not on file   Transportation Needs: Not on file   Physical Activity: Not on file   Stress: Not on file   Social Connections: Not on file   Intimate Partner Violence: Not on file   Housing Stability: Not on file       Family History   Problem Relation Age of Onset    Esophageal cancer Mother     Colon cancer Father     Lymphoma Father     Lung cancer Father        No Known Allergies      Current Outpatient Medications:     b complex vitamins capsule, Take 1 capsule by mouth daily, Disp: , Rfl:     Ginseng 100 MG CAPS, Take by mouth, Disp: , Rfl:     cholecalciferol (VITAMIN D3) 1,000 units tablet, Take 1,000 Units by mouth daily, Disp: , Rfl:     Flaxseed, Linseed, (Flax Seed Oil) 1000 MG CAPS, Take by mouth (Patient not taking: Reported on 8/27/2024), Disp: , Rfl:     Multiple Vitamins-Minerals (Mens Multi Vitamin & Mineral) TABS, Take 1 tablet by mouth daily (Patient not taking: Reported on 8/27/2024), Disp: , Rfl:     Probiotic Product (Probiotic Blend) CAPS, Take 1 capsule by mouth in the morning (Patient not taking: Reported on 8/27/2024), Disp: , Rfl:     ZINC-VITAMIN C PO, Take 1 Dose by mouth daily Chewable (Patient not taking: Reported on 8/27/2024), Disp: , Rfl:       Physical Exam:  /74 (BP Location: Right arm, Patient Position: Sitting, Cuff Size: Large)   Pulse (!) 53   Temp 97.6 °F (36.4 °C) (Temporal)   Resp 18   Ht 5' 8.5\" (1.74 m)   Wt 103 kg (227 lb)   SpO2 96%   BMI 34.01 kg/m²     Physical Exam  Vitals reviewed.   Constitutional:       General: He is not in acute distress.     Appearance: He is well-developed. He is not ill-appearing.   HENT:      Head: Normocephalic and atraumatic. "   Eyes:      General: No scleral icterus.     Conjunctiva/sclera: Conjunctivae normal.   Cardiovascular:      Rate and Rhythm: Normal rate and regular rhythm.      Heart sounds: Normal heart sounds. No murmur heard.  Pulmonary:      Effort: Pulmonary effort is normal. No respiratory distress.      Breath sounds: Normal breath sounds.   Abdominal:      Palpations: Abdomen is soft.      Tenderness: There is no abdominal tenderness.   Musculoskeletal:         General: No tenderness. Normal range of motion.      Cervical back: Normal range of motion and neck supple.      Right lower leg: No edema.      Left lower leg: No edema.   Lymphadenopathy:      Cervical: No cervical adenopathy.   Skin:     General: Skin is warm and dry.   Neurological:      Mental Status: He is alert and oriented to person, place, and time.      Cranial Nerves: No cranial nerve deficit.   Psychiatric:         Mood and Affect: Mood normal.         Behavior: Behavior normal.       Labs:      I have spent 20 minutes with Patient  today in which greater than 50% of this time was spent in counseling/coordination of care regarding Diagnostic results, Risks and benefits of tx options, Instructions for management, Impressions, Documenting in the medical record, Reviewing / ordering tests, medicine, procedures  , and Obtaining or reviewing history  .     Patient voiced understanding and agreement in the above discussion. Aware to contact our office with questions/symptoms in the interim.     This note has been generated by voice recognition software system.  Therefore, there may be spelling, grammar, and or syntax errors. Please contact if questions arise.

## 2024-12-23 ENCOUNTER — APPOINTMENT (OUTPATIENT)
Dept: LAB | Facility: CLINIC | Age: 64
End: 2024-12-23
Payer: COMMERCIAL

## 2024-12-23 DIAGNOSIS — C91.10 CLL (CHRONIC LYMPHOCYTIC LEUKEMIA) (HCC): ICD-10-CM

## 2024-12-23 LAB
ALBUMIN SERPL BCG-MCNC: 4.5 G/DL (ref 3.5–5)
ALP SERPL-CCNC: 80 U/L (ref 34–104)
ALT SERPL W P-5'-P-CCNC: 25 U/L (ref 7–52)
ANION GAP SERPL CALCULATED.3IONS-SCNC: 6 MMOL/L (ref 4–13)
AST SERPL W P-5'-P-CCNC: 22 U/L (ref 13–39)
BASOPHILS # BLD MANUAL: 0 THOUSAND/UL (ref 0–0.1)
BASOPHILS NFR MAR MANUAL: 0 % (ref 0–1)
BILIRUB SERPL-MCNC: 0.63 MG/DL (ref 0.2–1)
BUN SERPL-MCNC: 21 MG/DL (ref 5–25)
CALCIUM SERPL-MCNC: 9.5 MG/DL (ref 8.4–10.2)
CHLORIDE SERPL-SCNC: 103 MMOL/L (ref 96–108)
CO2 SERPL-SCNC: 28 MMOL/L (ref 21–32)
CREAT SERPL-MCNC: 0.94 MG/DL (ref 0.6–1.3)
DIFFERENTIAL COMMENT: ABNORMAL
EOSINOPHIL # BLD MANUAL: 0 THOUSAND/UL (ref 0–0.4)
EOSINOPHIL NFR BLD MANUAL: 0 % (ref 0–6)
ERYTHROCYTE [DISTWIDTH] IN BLOOD BY AUTOMATED COUNT: 13.2 % (ref 11.6–15.1)
GFR SERPL CREATININE-BSD FRML MDRD: 84 ML/MIN/1.73SQ M
GLUCOSE P FAST SERPL-MCNC: 109 MG/DL (ref 65–99)
HCT VFR BLD AUTO: 47.6 % (ref 36.5–49.3)
HGB BLD-MCNC: 15 G/DL (ref 12–17)
IGG SERPL-MCNC: 970 MG/DL (ref 635–1741)
LYMPHOCYTES # BLD AUTO: 75 % (ref 14–44)
LYMPHOCYTES # BLD AUTO: 89.27 THOUSAND/UL (ref 0.6–4.47)
MCH RBC QN AUTO: 30 PG (ref 26.8–34.3)
MCHC RBC AUTO-ENTMCNC: 31.5 G/DL (ref 31.4–37.4)
MCV RBC AUTO: 95 FL (ref 82–98)
MONOCYTES # BLD AUTO: 0.94 THOUSAND/UL (ref 0–1.22)
MONOCYTES NFR BLD: 1 % (ref 4–12)
NEUTROPHILS # BLD MANUAL: 3.76 THOUSAND/UL (ref 1.85–7.62)
NEUTS SEG NFR BLD AUTO: 4 % (ref 43–75)
PLATELET # BLD AUTO: 229 THOUSANDS/UL (ref 149–390)
PLATELET BLD QL SMEAR: ADEQUATE
PMV BLD AUTO: 9.6 FL (ref 8.9–12.7)
POTASSIUM SERPL-SCNC: 4.4 MMOL/L (ref 3.5–5.3)
PROT SERPL-MCNC: 6.9 G/DL (ref 6.4–8.4)
RBC # BLD AUTO: 5 MILLION/UL (ref 3.88–5.62)
RBC MORPH BLD: NORMAL
SMUDGE CELLS BLD QL SMEAR: PRESENT
SODIUM SERPL-SCNC: 137 MMOL/L (ref 135–147)
VARIANT LYMPHS # BLD AUTO: 20 %
WBC # BLD AUTO: 93.97 THOUSAND/UL (ref 4.31–10.16)

## 2024-12-23 PROCEDURE — 85007 BL SMEAR W/DIFF WBC COUNT: CPT

## 2024-12-23 PROCEDURE — 80053 COMPREHEN METABOLIC PANEL: CPT

## 2024-12-23 PROCEDURE — 85027 COMPLETE CBC AUTOMATED: CPT

## 2024-12-23 PROCEDURE — 36415 COLL VENOUS BLD VENIPUNCTURE: CPT

## 2024-12-23 PROCEDURE — 82784 ASSAY IGA/IGD/IGG/IGM EACH: CPT

## 2024-12-31 ENCOUNTER — OFFICE VISIT (OUTPATIENT)
Dept: HEMATOLOGY ONCOLOGY | Facility: CLINIC | Age: 64
End: 2024-12-31
Payer: COMMERCIAL

## 2024-12-31 VITALS
BODY MASS INDEX: 35.25 KG/M2 | OXYGEN SATURATION: 96 % | TEMPERATURE: 97.6 F | HEIGHT: 69 IN | RESPIRATION RATE: 18 BRPM | HEART RATE: 49 BPM | WEIGHT: 238 LBS | SYSTOLIC BLOOD PRESSURE: 138 MMHG | DIASTOLIC BLOOD PRESSURE: 82 MMHG

## 2024-12-31 DIAGNOSIS — R73.01 ELEVATED FASTING GLUCOSE: ICD-10-CM

## 2024-12-31 DIAGNOSIS — C91.10 CLL (CHRONIC LYMPHOCYTIC LEUKEMIA) (HCC): Primary | ICD-10-CM

## 2024-12-31 PROCEDURE — 99214 OFFICE O/P EST MOD 30 MIN: CPT | Performed by: PHYSICIAN ASSISTANT

## 2024-12-31 NOTE — PROGRESS NOTES
Name: Sekou Dowling      : 1960      MRN: [id number]  Encounter Provider: Enedelia Phan PA-C  Encounter Date: 2024   Encounter department: [facility] [place]  :  Assessment & Plan  CLL (chronic lymphocytic leukemia) (HCC)  We reviewed that WBC continues to rise however hemoglobin and platelets remain normal, he has no palpable adenopathy on physical exam  He has no recent or frequent or severe infections  Of note he did have COVID recently and did not require any antiviral therapy  We will continue to monitor labs every 3 to 4 months.  Reviewed indications for treatment of his CLL and when/if that happens the first-line therapy at this time is a B TKI which is oral therapy  Follow-up in 4 months    Orders:    CBC and differential; Future    Comprehensive metabolic panel; Future    IgG; Future    Elevated fasting glucose  Fasting glucose was elevated     Orders:    Hemoglobin A1C; Future      History of Present Illness   Chief Complaint   Patient presents with    Follow-up   Sekou Dowling is a 64 y.o. male presents for follow up for CLL.     Pertinent Medical History   follow-up visit regarding history of CLL.      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 deletion, negative for p53 deletion or amplification, negative for trisomy 12.      Review of Systems   Constitutional:  Positive for fatigue. Negative for appetite change, chills, fever and unexpected weight change.   Respiratory:  Negative for cough and shortness of breath.    Cardiovascular:  Negative for chest pain, palpitations and leg swelling.   Gastrointestinal:  Negative for abdominal pain, constipation, diarrhea, nausea and vomiting.   Genitourinary:  Negative for difficulty urinating, dysuria and hematuria.   Musculoskeletal:  Negative for arthralgias.   Skin: Negative.    Neurological:  Negative for dizziness, weakness, light-headedness, numbness and headaches.   Hematological:  "Negative.    Psychiatric/Behavioral: Negative.           Objective   /82 (BP Location: Left arm, Patient Position: Sitting, Cuff Size: Large)   Pulse (!) 49   Temp 97.6 °F (36.4 °C) (Temporal)   Resp 18   Ht 5' 8.5\" (1.74 m)   Wt 108 kg (238 lb)   SpO2 96%   BMI 35.66 kg/m²     ECOG   0  Physical Exam  Vitals reviewed.   Constitutional:       General: He is not in acute distress.     Appearance: He is well-developed. He is not ill-appearing.   HENT:      Head: Normocephalic and atraumatic.   Eyes:      General: No scleral icterus.     Conjunctiva/sclera: Conjunctivae normal.   Cardiovascular:      Rate and Rhythm: Normal rate and regular rhythm.      Heart sounds: Normal heart sounds. No murmur heard.  Pulmonary:      Effort: Pulmonary effort is normal. No respiratory distress.      Breath sounds: Normal breath sounds.   Abdominal:      Palpations: Abdomen is soft.      Tenderness: There is no abdominal tenderness.   Musculoskeletal:         General: No tenderness. Normal range of motion.      Cervical back: Normal range of motion and neck supple.      Right lower leg: No edema.      Left lower leg: No edema.   Lymphadenopathy:      Cervical: No cervical adenopathy.   Skin:     General: Skin is warm and dry.   Neurological:      Mental Status: He is alert and oriented to person, place, and time.      Cranial Nerves: No cranial nerve deficit.   Psychiatric:         Mood and Affect: Mood normal.         Behavior: Behavior normal.       Labs: I have reviewed the following labs:  Lab Results   Component Value Date/Time    WBC 93.97 (H) 12/23/2024 10:33 AM    RBC 5.00 12/23/2024 10:33 AM    Hemoglobin 15.0 12/23/2024 10:33 AM    Hematocrit 47.6 12/23/2024 10:33 AM    MCV 95 12/23/2024 10:33 AM    MCH 30.0 12/23/2024 10:33 AM    RDW 13.2 12/23/2024 10:33 AM    Platelets 229 12/23/2024 10:33 AM    Lymphocytes % 75 (H) 12/23/2024 10:33 AM    Monocytes % 1 (L) 12/23/2024 10:33 AM    Eosinophils % 0 12/23/2024 " 10:33 AM    Basophils % 0 12/23/2024 10:33 AM     Lab Results   Component Value Date/Time    Potassium 4.4 12/23/2024 10:33 AM    Chloride 103 12/23/2024 10:33 AM    CO2 28 12/23/2024 10:33 AM    BUN 21 12/23/2024 10:33 AM    Creatinine 0.94 12/23/2024 10:33 AM    Glucose, Fasting 109 (H) 12/23/2024 10:33 AM    Calcium 9.5 12/23/2024 10:33 AM    AST 22 12/23/2024 10:33 AM    ALT 25 12/23/2024 10:33 AM    Alkaline Phosphatase 80 12/23/2024 10:33 AM    Total Protein 6.9 12/23/2024 10:33 AM    Albumin 4.5 12/23/2024 10:33 AM    Total Bilirubin 0.63 12/23/2024 10:33 AM    eGFR 84 12/23/2024 10:33 AM             Administrative Statements   I have spent a total time of 30 minutes in caring for this patient on the day of the visit/encounter including Diagnostic results, Risks and benefits of tx options, Instructions for management, Patient and family education, Impressions, Documenting in the medical record, Reviewing / ordering tests, medicine, procedures  , and Obtaining or reviewing history  .

## 2025-04-25 ENCOUNTER — TELEPHONE (OUTPATIENT)
Dept: HEMATOLOGY ONCOLOGY | Facility: CLINIC | Age: 65
End: 2025-04-25

## 2025-04-25 ENCOUNTER — APPOINTMENT (OUTPATIENT)
Dept: LAB | Facility: CLINIC | Age: 65
End: 2025-04-25
Payer: COMMERCIAL

## 2025-04-25 DIAGNOSIS — R73.01 ELEVATED FASTING GLUCOSE: ICD-10-CM

## 2025-04-25 DIAGNOSIS — C91.10 CLL (CHRONIC LYMPHOCYTIC LEUKEMIA) (HCC): ICD-10-CM

## 2025-04-25 LAB
ALBUMIN SERPL BCG-MCNC: 4.5 G/DL (ref 3.5–5)
ALP SERPL-CCNC: 96 U/L (ref 34–104)
ALT SERPL W P-5'-P-CCNC: 28 U/L (ref 7–52)
ANION GAP SERPL CALCULATED.3IONS-SCNC: 9 MMOL/L (ref 4–13)
AST SERPL W P-5'-P-CCNC: 22 U/L (ref 13–39)
BASOPHILS # BLD MANUAL: 0 THOUSAND/UL (ref 0–0.1)
BASOPHILS NFR MAR MANUAL: 0 % (ref 0–1)
BILIRUB SERPL-MCNC: 0.62 MG/DL (ref 0.2–1)
BUN SERPL-MCNC: 20 MG/DL (ref 5–25)
CALCIUM SERPL-MCNC: 9.4 MG/DL (ref 8.4–10.2)
CHLORIDE SERPL-SCNC: 104 MMOL/L (ref 96–108)
CO2 SERPL-SCNC: 28 MMOL/L (ref 21–32)
CREAT SERPL-MCNC: 1 MG/DL (ref 0.6–1.3)
DIFFERENTIAL COMMENT: ABNORMAL
EOSINOPHIL # BLD MANUAL: 0 THOUSAND/UL (ref 0–0.4)
EOSINOPHIL NFR BLD MANUAL: 0 % (ref 0–6)
ERYTHROCYTE [DISTWIDTH] IN BLOOD BY AUTOMATED COUNT: 13.5 % (ref 11.6–15.1)
EST. AVERAGE GLUCOSE BLD GHB EST-MCNC: 128 MG/DL
GFR SERPL CREATININE-BSD FRML MDRD: 78 ML/MIN/1.73SQ M
GLUCOSE SERPL-MCNC: 112 MG/DL (ref 65–140)
HBA1C MFR BLD: 6.1 %
HCT VFR BLD AUTO: 49.5 % (ref 36.5–49.3)
HGB BLD-MCNC: 15.3 G/DL (ref 12–17)
IGG SERPL-MCNC: 1003 MG/DL (ref 635–1741)
LYMPHOCYTES # BLD AUTO: 86 % (ref 14–44)
LYMPHOCYTES # BLD AUTO: 97.53 THOUSAND/UL (ref 0.6–4.47)
MCH RBC QN AUTO: 29.8 PG (ref 26.8–34.3)
MCHC RBC AUTO-ENTMCNC: 30.9 G/DL (ref 31.4–37.4)
MCV RBC AUTO: 97 FL (ref 82–98)
MONOCYTES # BLD AUTO: 1.08 THOUSAND/UL (ref 0–1.22)
MONOCYTES NFR BLD: 1 % (ref 4–12)
NEUTROPHILS # BLD MANUAL: 9.75 THOUSAND/UL (ref 1.85–7.62)
NEUTS SEG NFR BLD AUTO: 9 % (ref 43–75)
PLATELET # BLD AUTO: 241 THOUSANDS/UL (ref 149–390)
PLATELET BLD QL SMEAR: ADEQUATE
PMV BLD AUTO: 9.7 FL (ref 8.9–12.7)
POTASSIUM SERPL-SCNC: 4.8 MMOL/L (ref 3.5–5.3)
PROT SERPL-MCNC: 7.2 G/DL (ref 6.4–8.4)
RBC # BLD AUTO: 5.13 MILLION/UL (ref 3.88–5.62)
RBC MORPH BLD: NORMAL
SMUDGE CELLS BLD QL SMEAR: PRESENT
SODIUM SERPL-SCNC: 141 MMOL/L (ref 135–147)
VARIANT LYMPHS # BLD AUTO: 4 %
WBC # BLD AUTO: 108.37 THOUSAND/UL (ref 4.31–10.16)

## 2025-04-25 PROCEDURE — 85027 COMPLETE CBC AUTOMATED: CPT

## 2025-04-25 PROCEDURE — 83036 HEMOGLOBIN GLYCOSYLATED A1C: CPT

## 2025-04-25 PROCEDURE — 36415 COLL VENOUS BLD VENIPUNCTURE: CPT

## 2025-04-25 PROCEDURE — 80053 COMPREHEN METABOLIC PANEL: CPT

## 2025-04-25 PROCEDURE — 85007 BL SMEAR W/DIFF WBC COUNT: CPT

## 2025-04-25 PROCEDURE — 82784 ASSAY IGA/IGD/IGG/IGM EACH: CPT

## 2025-04-25 NOTE — TELEPHONE ENCOUNTER
Left message for patient in regards to obtaining labwork prior to upcoming appointment with Buck Phan on 4/30.  Advised patient labs are non fasting and in system.  Advised patient to call office back if they are unable to obtain labwork prior to appointment.  [employer] number provided.

## 2025-04-30 ENCOUNTER — OFFICE VISIT (OUTPATIENT)
Dept: HEMATOLOGY ONCOLOGY | Facility: CLINIC | Age: 65
End: 2025-04-30
Payer: MEDICARE

## 2025-04-30 VITALS
SYSTOLIC BLOOD PRESSURE: 130 MMHG | WEIGHT: 244 LBS | TEMPERATURE: 97.8 F | RESPIRATION RATE: 18 BRPM | BODY MASS INDEX: 36.14 KG/M2 | HEIGHT: 69 IN | OXYGEN SATURATION: 97 % | DIASTOLIC BLOOD PRESSURE: 78 MMHG | HEART RATE: 79 BPM

## 2025-04-30 DIAGNOSIS — R73.01 IMPAIRED FASTING GLUCOSE: ICD-10-CM

## 2025-04-30 DIAGNOSIS — C91.10 CLL (CHRONIC LYMPHOCYTIC LEUKEMIA) (HCC): Primary | ICD-10-CM

## 2025-04-30 PROCEDURE — 99214 OFFICE O/P EST MOD 30 MIN: CPT | Performed by: PHYSICIAN ASSISTANT

## 2025-04-30 PROCEDURE — G2211 COMPLEX E/M VISIT ADD ON: HCPCS | Performed by: PHYSICIAN ASSISTANT

## 2025-04-30 RX ORDER — DIPHENOXYLATE HYDROCHLORIDE AND ATROPINE SULFATE 2.5; .025 MG/1; MG/1
1 TABLET ORAL DAILY
COMMUNITY

## 2025-04-30 NOTE — PROGRESS NOTES
Name: Sekou Dowling      : 1960      MRN: [id number]  Encounter Provider: Enedelia Phan PA-C  Encounter Date: 2025   Encounter department: [facility]  :  Assessment & Plan  CLL (chronic lymphocytic leukemia) (HCC)  WBC continues to increase however no anemia, thrombocytopenia, CLL symptoms  Monitoring is favored  Again we reviewed indications for treatment  Treatment would include oral therapy.  We briefly reviewed these as well.  Follow-up in 4 months with labs    Call sooner with any new or progressive symptoms    Orders:    CBC and differential; Future    Comprehensive metabolic panel; Future    IgG; Future    Impaired fasting glucose  Reviewed that hemoglobin A1c shows level of 6.1  Patient needs to have intentional weight loss and watch his diet  Follow-up with PCP as well         Assessment & Plan        Return in about 4 months (around 2025) for follow up Buck, follow up in office, print lab orders.    History of Present Illness   Chief Complaint   Patient presents with    Follow-up     History of Present Illness      Pertinent Medical History     25:   follow-up visit regarding history of CLL.      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 deletion, negative for p53 deletion or amplification, negative for trisomy 12       Review of Systems   Constitutional:  Negative for activity change, appetite change, chills, fatigue, fever and unexpected weight change.        Denies night sweats    Respiratory:  Negative for cough and shortness of breath.    Cardiovascular:  Negative for chest pain, palpitations and leg swelling.   Gastrointestinal:  Negative for abdominal pain, constipation, diarrhea, nausea and vomiting.   Genitourinary:  Negative for difficulty urinating, dysuria and hematuria.   Musculoskeletal:  Negative for arthralgias.   Skin: Negative.    Neurological:  Negative for dizziness, weakness, light-headedness,  "numbness and headaches.   Hematological: Negative.    Psychiatric/Behavioral: Negative.             Objective   /78 (BP Location: Left arm, Patient Position: Sitting, Cuff Size: Adult)   Pulse 79   Temp 97.8 °F (36.6 °C)   Resp 18   Ht 5' 8.5\" (1.74 m)   Wt 111 kg (244 lb)   SpO2 97%   BMI 36.56 kg/m²     Pain Screening:  Pain Score: 0-No pain  ECOG   0    Physical Exam  Vitals reviewed.   Constitutional:       General: He is not in acute distress.     Appearance: He is well-developed. He is not ill-appearing.   HENT:      Head: Normocephalic and atraumatic.     Eyes:      General: No scleral icterus.     Conjunctiva/sclera: Conjunctivae normal.       Cardiovascular:      Rate and Rhythm: Normal rate and regular rhythm.      Heart sounds: Normal heart sounds. No murmur heard.  Pulmonary:      Effort: Pulmonary effort is normal. No respiratory distress.      Breath sounds: Normal breath sounds.   Abdominal:      Palpations: Abdomen is soft.      Tenderness: There is no abdominal tenderness.     Musculoskeletal:         General: No tenderness. Normal range of motion.      Cervical back: Normal range of motion and neck supple.      Right lower leg: No edema.      Left lower leg: No edema.   Lymphadenopathy:      Cervical: No cervical adenopathy.     Skin:     General: Skin is warm and dry.     Neurological:      Mental Status: He is alert and oriented to person, place, and time.      Cranial Nerves: No cranial nerve deficit.     Psychiatric:         Mood and Affect: Mood normal.         Behavior: Behavior normal.       Physical Exam      Results          Labs: I have reviewed the following labs:            "

## 2025-06-27 NOTE — ASSESSMENT & PLAN NOTE
Reviewed that hemoglobin A1c shows level of 6.1  Patient needs to have intentional weight loss and watch his diet  Follow-up with PCP as well

## 2025-06-27 NOTE — ASSESSMENT & PLAN NOTE
WBC continues to increase however no anemia, thrombocytopenia, CLL symptoms  Monitoring is favored  Again we reviewed indications for treatment  Treatment would include oral therapy.  We briefly reviewed these as well.  Follow-up in 4 months with labs    Call sooner with any new or progressive symptoms    Orders:    CBC and differential; Future    Comprehensive metabolic panel; Future    IgG; Future